# Patient Record
Sex: FEMALE | Race: WHITE | NOT HISPANIC OR LATINO | Employment: OTHER | URBAN - METROPOLITAN AREA
[De-identification: names, ages, dates, MRNs, and addresses within clinical notes are randomized per-mention and may not be internally consistent; named-entity substitution may affect disease eponyms.]

---

## 2018-05-09 ENCOUNTER — LAB VISIT (OUTPATIENT)
Dept: LAB | Facility: HOSPITAL | Age: 58
End: 2018-05-09
Attending: FAMILY MEDICINE
Payer: COMMERCIAL

## 2018-05-09 ENCOUNTER — OFFICE VISIT (OUTPATIENT)
Dept: FAMILY MEDICINE | Facility: CLINIC | Age: 58
End: 2018-05-09
Payer: COMMERCIAL

## 2018-05-09 VITALS
TEMPERATURE: 98 F | HEIGHT: 66 IN | WEIGHT: 182 LBS | SYSTOLIC BLOOD PRESSURE: 115 MMHG | BODY MASS INDEX: 29.25 KG/M2 | HEART RATE: 81 BPM | OXYGEN SATURATION: 95 % | DIASTOLIC BLOOD PRESSURE: 70 MMHG | RESPIRATION RATE: 18 BRPM

## 2018-05-09 DIAGNOSIS — E55.9 VITAMIN D DEFICIENCY: Primary | ICD-10-CM

## 2018-05-09 DIAGNOSIS — E78.2 MIXED HYPERLIPIDEMIA: Chronic | ICD-10-CM

## 2018-05-09 DIAGNOSIS — J43.8 OTHER EMPHYSEMA: Chronic | ICD-10-CM

## 2018-05-09 DIAGNOSIS — K21.9 GASTROESOPHAGEAL REFLUX DISEASE WITHOUT ESOPHAGITIS: Chronic | ICD-10-CM

## 2018-05-09 DIAGNOSIS — R92.8 ABNORMAL MAMMOGRAM: ICD-10-CM

## 2018-05-09 DIAGNOSIS — E55.9 VITAMIN D DEFICIENCY: ICD-10-CM

## 2018-05-09 PROBLEM — I10 ESSENTIAL HYPERTENSION: Chronic | Status: ACTIVE | Noted: 2018-05-09

## 2018-05-09 LAB — 25(OH)D3+25(OH)D2 SERPL-MCNC: 27 NG/ML

## 2018-05-09 PROCEDURE — 82306 VITAMIN D 25 HYDROXY: CPT

## 2018-05-09 PROCEDURE — 99214 OFFICE O/P EST MOD 30 MIN: CPT | Mod: S$GLB,,, | Performed by: FAMILY MEDICINE

## 2018-05-09 PROCEDURE — 3008F BODY MASS INDEX DOCD: CPT | Mod: CPTII,S$GLB,, | Performed by: FAMILY MEDICINE

## 2018-05-09 PROCEDURE — 36415 COLL VENOUS BLD VENIPUNCTURE: CPT

## 2018-05-09 RX ORDER — OMEPRAZOLE 40 MG/1
1 CAPSULE, DELAYED RELEASE ORAL DAILY PRN
COMMUNITY
Start: 2018-04-11 | End: 2021-03-11

## 2018-05-09 RX ORDER — ALBUTEROL SULFATE 90 UG/1
2 AEROSOL, METERED RESPIRATORY (INHALATION) EVERY 4 HOURS PRN
Refills: 0 | COMMUNITY
Start: 2018-03-21 | End: 2020-11-02 | Stop reason: SDUPTHER

## 2018-05-09 RX ORDER — LISINOPRIL 20 MG/1
1 TABLET ORAL DAILY
COMMUNITY
Start: 2018-03-27 | End: 2020-03-16 | Stop reason: SDUPTHER

## 2018-05-09 RX ORDER — FLUOXETINE HYDROCHLORIDE 20 MG/1
1 CAPSULE ORAL DAILY
Status: ON HOLD | COMMUNITY
Start: 2018-04-11 | End: 2020-02-04

## 2018-05-09 RX ORDER — ROSUVASTATIN CALCIUM 40 MG/1
1 TABLET, COATED ORAL DAILY
COMMUNITY
Start: 2018-02-21 | End: 2019-12-05

## 2018-05-09 RX ORDER — ESTROGENS, CONJUGATED 0.62 MG/1
1 TABLET, FILM COATED ORAL DAILY
COMMUNITY
Start: 2018-02-21 | End: 2019-12-05

## 2018-05-09 NOTE — PROGRESS NOTES
Subjective:       Patient ID: Josseline Degroot is a 58 y.o. female.    Chief Complaint: Emphysema (Doesn't like spiriva )    HPI   Ms. Degroot presents for follow-up. Had an abnormal mammogram in January and told to repeat in 6 months.     Was started on Spiriva for COPD at her last visit. Reports she doesn't like this and has stopped using it because she feels like she is inhaling pieces of the capsule.    Complains of diffuse myalgias, mild, since taking statins. Worse with previous statins.     Reports she's had diarrhea since starting omeprazole and wants to try taking it less often.    Review of Systems   Constitutional: Negative for activity change, appetite change, diaphoresis and fatigue.   Respiratory: Positive for cough and wheezing. Negative for shortness of breath and stridor.    Cardiovascular: Negative for chest pain, palpitations and leg swelling.   Musculoskeletal: Positive for myalgias. Negative for gait problem, joint swelling and neck pain.       Past Medical History:   Diagnosis Date    Asthma     Depression     GERD (gastroesophageal reflux disease)     Hyperlipidemia     Hypertension      Past Surgical History:   Procedure Laterality Date    COLONOSCOPY      HYSTERECTOMY      TOTAL     Social History     Social History    Marital status:      Spouse name: N/A    Number of children: N/A    Years of education: N/A     Occupational History    Not on file.     Social History Main Topics    Smoking status: Former Smoker     Types: Cigarettes     Quit date: 10/1/2017    Smokeless tobacco: Never Used    Alcohol use No    Drug use: No    Sexual activity: No     Other Topics Concern    Not on file     Social History Narrative    No narrative on file     Family History   Problem Relation Age of Onset    Heart disease Mother     Heart disease Father     Cancer Father     Heart disease Brother        Objective:      /70 (BP Location: Left arm, Patient Position: Sitting)   Pulse  "81   Temp 97.9 °F (36.6 °C) (Tympanic)   Resp 18   Ht 5' 6" (1.676 m)   Wt 82.6 kg (182 lb)   LMP  (LMP Unknown)   SpO2 95%   BMI 29.38 kg/m²   Physical Exam   Constitutional: She is oriented to person, place, and time. She appears well-developed and well-nourished. No distress.   Cardiovascular: Normal rate and regular rhythm.    No murmur heard.  Neurological: She is alert and oriented to person, place, and time.   Skin: Skin is warm and dry. She is not diaphoretic. No pallor.   Psychiatric: She has a normal mood and affect. Her behavior is normal. Judgment and thought content normal.   Vitals reviewed.      Assessment:       1. Vitamin D deficiency    2. Other emphysema    3. Mixed hyperlipidemia    4. Gastroesophageal reflux disease without esophagitis    5. Abnormal mammogram        Plan:       Vitamin D deficiency  -     Muscle pains potentially due to statin vs vit d def vs other cause; check vitamin D level    Other emphysema  -     Stop spiriva; start anoro ellipta  -     umeclidinium-vilanterol (ANORO ELLIPTA) 62.5-25 mcg/actuation DsDv; Inhale 1 puff into the lungs once daily.  Dispense: 1 each; Refill: 0  -     umeclidinium-vilanterol (ANORO ELLIPTA) 62.5-25 mcg/actuation DsDv; Inhale 1 puff into the lungs once daily.  Dispense: 3 each; Refill: 3    Mixed hyperlipidemia  -    On crestor, possibly causing muscle pains - start taking CoQ10    Gastroesophageal reflux disease without esophagitis  - change omeprazole frequency to prn    Abnormal mammogram  -     Mammo Digital Diagnostic Bilateral; Future; Expected date: 07/11/2018            Risks, benefits, and side effects were discussed with the patient. All questions were answered to the fullest satisfaction of the patient, and pt verbalized understanding and agreement to treatment plan. Pt was to call with any new or worsening symptoms, or present to the ER.    "

## 2018-05-10 ENCOUNTER — TELEPHONE (OUTPATIENT)
Dept: FAMILY MEDICINE | Facility: CLINIC | Age: 58
End: 2018-05-10

## 2018-05-10 DIAGNOSIS — E55.9 VITAMIN D DEFICIENCY: Primary | ICD-10-CM

## 2018-05-10 RX ORDER — ERGOCALCIFEROL 1.25 MG/1
50000 CAPSULE ORAL
Qty: 4 CAPSULE | Refills: 2 | Status: SHIPPED | OUTPATIENT
Start: 2018-05-10 | End: 2020-11-02 | Stop reason: SDUPTHER

## 2018-05-10 NOTE — TELEPHONE ENCOUNTER
----- Message from Bessy Yan sent at 5/10/2018  9:20 AM CDT -----  Type:  Patient Returning Call    Who Called:  Patient  Who Left Message for Patient:  Not documented  Does the patient know what this is regarding?:  Possibly test results  Best Call Back Number:  [phone]  Additional Information:  Phone call not documented in file.

## 2018-05-10 NOTE — TELEPHONE ENCOUNTER
Pt contacted informed of Vit D level being low and rx Vit D being sent to pharmacy,. Pt verbalized understanding

## 2019-05-02 DIAGNOSIS — Z12.11 COLON CANCER SCREENING: ICD-10-CM

## 2019-05-03 DIAGNOSIS — Z11.59 NEED FOR HEPATITIS C SCREENING TEST: ICD-10-CM

## 2019-12-05 ENCOUNTER — OFFICE VISIT (OUTPATIENT)
Dept: FAMILY MEDICINE | Facility: CLINIC | Age: 59
End: 2019-12-05
Payer: COMMERCIAL

## 2019-12-05 VITALS
TEMPERATURE: 98 F | BODY MASS INDEX: 30.24 KG/M2 | RESPIRATION RATE: 15 BRPM | WEIGHT: 188.19 LBS | SYSTOLIC BLOOD PRESSURE: 125 MMHG | HEART RATE: 84 BPM | OXYGEN SATURATION: 95 % | HEIGHT: 66 IN | DIASTOLIC BLOOD PRESSURE: 72 MMHG

## 2019-12-05 DIAGNOSIS — I10 PRIMARY HYPERTENSION: ICD-10-CM

## 2019-12-05 DIAGNOSIS — F41.9 ANXIETY: ICD-10-CM

## 2019-12-05 DIAGNOSIS — J44.9 CHRONIC OBSTRUCTIVE PULMONARY DISEASE, UNSPECIFIED COPD TYPE: ICD-10-CM

## 2019-12-05 DIAGNOSIS — E55.9 VITAMIN D DEFICIENCY: ICD-10-CM

## 2019-12-05 DIAGNOSIS — E78.5 HYPERLIPIDEMIA, UNSPECIFIED HYPERLIPIDEMIA TYPE: Primary | ICD-10-CM

## 2019-12-05 DIAGNOSIS — N95.1 MENOPAUSAL SYMPTOM: ICD-10-CM

## 2019-12-05 PROCEDURE — 99214 OFFICE O/P EST MOD 30 MIN: CPT | Mod: S$GLB,,, | Performed by: FAMILY MEDICINE

## 2019-12-05 PROCEDURE — 99214 PR OFFICE/OUTPT VISIT, EST, LEVL IV, 30-39 MIN: ICD-10-PCS | Mod: S$GLB,,, | Performed by: FAMILY MEDICINE

## 2019-12-05 PROCEDURE — 3008F BODY MASS INDEX DOCD: CPT | Mod: CPTII,S$GLB,, | Performed by: FAMILY MEDICINE

## 2019-12-05 PROCEDURE — 3008F PR BODY MASS INDEX (BMI) DOCUMENTED: ICD-10-PCS | Mod: CPTII,S$GLB,, | Performed by: FAMILY MEDICINE

## 2019-12-05 RX ORDER — ROSUVASTATIN CALCIUM 10 MG/1
10 TABLET, COATED ORAL DAILY
Qty: 90 TABLET | Refills: 3 | Status: SHIPPED | OUTPATIENT
Start: 2019-12-05 | End: 2020-01-07 | Stop reason: SDUPTHER

## 2019-12-05 RX ORDER — BUPROPION HYDROCHLORIDE 150 MG/1
150 TABLET ORAL DAILY
COMMUNITY
End: 2020-11-06 | Stop reason: SDUPTHER

## 2019-12-05 RX ORDER — ESTRADIOL 0.5 MG/1
0.5 TABLET ORAL DAILY
Qty: 30 TABLET | Refills: 11 | Status: SHIPPED | OUTPATIENT
Start: 2019-12-05 | End: 2020-01-07 | Stop reason: SDUPTHER

## 2019-12-05 RX ORDER — EZETIMIBE 10 MG/1
10 TABLET ORAL DAILY
Status: ON HOLD | COMMUNITY
End: 2020-02-04

## 2019-12-05 NOTE — PROGRESS NOTES
"  [facility] - Clinic Note    Subjective      Ms. Degroot is a 59 y.o. female who presents to clinic to establish care.     Was previously on premarin. Started having hot sweats again. Was previously taken off prozac because it made her gain weight. Taking wellbutrin.     Dyslipidemia  Strong family history of CAD. Taken off crestor for joint pain that did not resolve.    COPD  Not smoking. Is on Anoro Ellipta. Used inhaler once.     Htn  Taking lisinopril. At goal today.     Has taken Adipex in the past for weight loss.           [facility] Josseline has a past medical history of COPD (chronic obstructive pulmonary disease), Depression, GERD (gastroesophageal reflux disease), Hyperlipidemia, and Hypertension.   PSXH Josseline has a past surgical history that includes Hysterectomy and Colonoscopy.    Josseline's family history includes Cancer in her father; Heart disease in her brother, father, and mother.    Josseline reports that she quit smoking about 2 years ago. Her smoking use included cigarettes. She has never used smokeless tobacco. She reports that she has current or past drug history. She reports that she does not drink alcohol.   [facility] Josseline has No Known Allergies.   MED Josseline has a current medication list which includes the following prescription(s): bupropion, ergocalciferol, ezetimibe, lisinopril, naltrexone-bupropion, omeprazole, umeclidinium-vilanterol, ventolin hfa, estradiol, fluoxetine, rosuvastatin, umeclidinium-vilanterol, and umeclidinium-vilanterol.     Review of Systems   Endocrine: Positive for heat intolerance.   Musculoskeletal: Negative for arthralgias.     Objective     /72 (BP Location: Left arm, Patient Position: Sitting, BP Method: Medium (Automatic))   Pulse 84   Temp 98.2 °F (36.8 °C) (Oral)   Resp 15   Ht 5' 6" (1.676 m)   Wt 85.4 kg (188 lb 3.2 oz)   LMP  (LMP Unknown)   SpO2 95%   BMI 30.38 kg/m²     Physical Exam   Constitutional: She is well-developed, well-nourished, and in no " distress. No distress.   HENT:   Head: Normocephalic and atraumatic.   Right Ear: External ear normal.   Left Ear: External ear normal.   Nose: Nose normal.   Mouth/Throat: Oropharynx is clear and moist.   Eyes: Conjunctivae are normal.   Cardiovascular: Normal rate, regular rhythm, normal heart sounds and intact distal pulses.   No murmur heard.  Pulmonary/Chest: Effort normal and breath sounds normal. She has no wheezes. She has no rales.   Abdominal: Soft. She exhibits no distension.   Musculoskeletal: She exhibits no deformity.   Lymphadenopathy:     She has no cervical adenopathy.   Neurological: She is alert.   Skin: Skin is warm and dry.   Psychiatric: Affect normal.   Vitals reviewed.    Assessment/Plan     1. Hyperlipidemia, unspecified hyperlipidemia type  ezetimibe (ZETIA) 10 mg tablet    Lipid panel    rosuvastatin (CRESTOR) 10 MG tablet   2. Primary hypertension  Comprehensive metabolic panel   3. Vitamin D deficiency  Vitamin D   4. Menopausal symptom  estradiol (ESTRACE) 0.5 MG tablet   5. Chronic obstructive pulmonary disease, unspecified COPD type  umeclidinium-vilanterol (ANORO ELLIPTA) 62.5-25 mcg/actuation DsDv   6. Anxiety  buPROPion (WELLBUTRIN XL) 150 MG TB24 tablet           Future Appointments   Date Time Provider Department Center   1/7/2020  9:00 AM Lori Cool MD [facility]       Lori Cool MD  Family Medicine  [facility]  [phone]

## 2019-12-06 ENCOUNTER — LAB VISIT (OUTPATIENT)
Dept: LAB | Facility: HOSPITAL | Age: 59
End: 2019-12-06
Attending: FAMILY MEDICINE
Payer: COMMERCIAL

## 2019-12-06 DIAGNOSIS — Z11.59 NEED FOR HEPATITIS C SCREENING TEST: ICD-10-CM

## 2019-12-06 DIAGNOSIS — E78.5 HYPERLIPIDEMIA, UNSPECIFIED HYPERLIPIDEMIA TYPE: ICD-10-CM

## 2019-12-06 DIAGNOSIS — I10 PRIMARY HYPERTENSION: ICD-10-CM

## 2019-12-06 DIAGNOSIS — E55.9 VITAMIN D DEFICIENCY: ICD-10-CM

## 2019-12-06 LAB
25(OH)D3+25(OH)D2 SERPL-MCNC: 34 NG/ML (ref 30–96)
ALBUMIN SERPL BCP-MCNC: 3.9 G/DL (ref 3.5–5.2)
ALP SERPL-CCNC: 69 U/L (ref 55–135)
ALT SERPL W/O P-5'-P-CCNC: 23 U/L (ref 10–44)
ANION GAP SERPL CALC-SCNC: 10 MMOL/L (ref 8–16)
AST SERPL-CCNC: 22 U/L (ref 10–40)
BILIRUB SERPL-MCNC: 0.5 MG/DL (ref 0.1–1)
BUN SERPL-MCNC: 13 MG/DL (ref 8–23)
CALCIUM SERPL-MCNC: 8.9 MG/DL (ref 8.7–10.5)
CHLORIDE SERPL-SCNC: 106 MMOL/L (ref 95–110)
CHOLEST SERPL-MCNC: 222 MG/DL (ref 120–199)
CHOLEST/HDLC SERPL: 4.2 {RATIO} (ref 2–5)
CO2 SERPL-SCNC: 25 MMOL/L (ref 23–29)
CREAT SERPL-MCNC: 0.7 MG/DL (ref 0.5–1.4)
EST. GFR  (AFRICAN AMERICAN): >60 ML/MIN/1.73 M^2
EST. GFR  (NON AFRICAN AMERICAN): >60 ML/MIN/1.73 M^2
GLUCOSE SERPL-MCNC: 105 MG/DL (ref 70–110)
HDLC SERPL-MCNC: 53 MG/DL (ref 40–75)
HDLC SERPL: 23.9 % (ref 20–50)
LDLC SERPL CALC-MCNC: 135.4 MG/DL (ref 63–159)
NONHDLC SERPL-MCNC: 169 MG/DL
POTASSIUM SERPL-SCNC: 4.4 MMOL/L (ref 3.5–5.1)
PROT SERPL-MCNC: 6.1 G/DL (ref 6–8.4)
SODIUM SERPL-SCNC: 141 MMOL/L (ref 136–145)
TRIGL SERPL-MCNC: 168 MG/DL (ref 30–150)

## 2019-12-06 PROCEDURE — 80061 LIPID PANEL: CPT

## 2019-12-06 PROCEDURE — 80053 COMPREHEN METABOLIC PANEL: CPT

## 2019-12-06 PROCEDURE — 82306 VITAMIN D 25 HYDROXY: CPT

## 2019-12-06 PROCEDURE — 36415 COLL VENOUS BLD VENIPUNCTURE: CPT

## 2019-12-26 ENCOUNTER — PATIENT OUTREACH (OUTPATIENT)
Dept: ADMINISTRATIVE | Facility: HOSPITAL | Age: 59
End: 2019-12-26

## 2020-01-07 ENCOUNTER — OFFICE VISIT (OUTPATIENT)
Dept: FAMILY MEDICINE | Facility: CLINIC | Age: 60
End: 2020-01-07
Payer: COMMERCIAL

## 2020-01-07 ENCOUNTER — PATIENT MESSAGE (OUTPATIENT)
Dept: FAMILY MEDICINE | Facility: CLINIC | Age: 60
End: 2020-01-07

## 2020-01-07 VITALS
BODY MASS INDEX: 30.82 KG/M2 | HEIGHT: 66 IN | TEMPERATURE: 98 F | SYSTOLIC BLOOD PRESSURE: 110 MMHG | RESPIRATION RATE: 15 BRPM | HEART RATE: 81 BPM | OXYGEN SATURATION: 95 % | WEIGHT: 191.81 LBS | DIASTOLIC BLOOD PRESSURE: 70 MMHG

## 2020-01-07 DIAGNOSIS — Z12.39 BREAST CANCER SCREENING: Primary | ICD-10-CM

## 2020-01-07 DIAGNOSIS — K21.9 GASTROESOPHAGEAL REFLUX DISEASE, ESOPHAGITIS PRESENCE NOT SPECIFIED: ICD-10-CM

## 2020-01-07 DIAGNOSIS — N95.1 MENOPAUSAL SYMPTOM: ICD-10-CM

## 2020-01-07 DIAGNOSIS — G89.29 CHRONIC BILATERAL LOW BACK PAIN WITH SCIATICA, SCIATICA LATERALITY UNSPECIFIED: ICD-10-CM

## 2020-01-07 DIAGNOSIS — E78.5 HYPERLIPIDEMIA, UNSPECIFIED HYPERLIPIDEMIA TYPE: ICD-10-CM

## 2020-01-07 DIAGNOSIS — E66.9 OBESITY (BMI 30-39.9): ICD-10-CM

## 2020-01-07 DIAGNOSIS — M54.40 CHRONIC BILATERAL LOW BACK PAIN WITH SCIATICA, SCIATICA LATERALITY UNSPECIFIED: ICD-10-CM

## 2020-01-07 PROCEDURE — 3008F PR BODY MASS INDEX (BMI) DOCUMENTED: ICD-10-PCS | Mod: CPTII,S$GLB,, | Performed by: FAMILY MEDICINE

## 2020-01-07 PROCEDURE — 99214 PR OFFICE/OUTPT VISIT, EST, LEVL IV, 30-39 MIN: ICD-10-PCS | Mod: S$GLB,,, | Performed by: FAMILY MEDICINE

## 2020-01-07 PROCEDURE — 3008F BODY MASS INDEX DOCD: CPT | Mod: CPTII,S$GLB,, | Performed by: FAMILY MEDICINE

## 2020-01-07 PROCEDURE — 99214 OFFICE O/P EST MOD 30 MIN: CPT | Mod: S$GLB,,, | Performed by: FAMILY MEDICINE

## 2020-01-07 PROCEDURE — 3074F SYST BP LT 130 MM HG: CPT | Mod: CPTII,S$GLB,, | Performed by: FAMILY MEDICINE

## 2020-01-07 PROCEDURE — 3078F DIAST BP <80 MM HG: CPT | Mod: CPTII,S$GLB,, | Performed by: FAMILY MEDICINE

## 2020-01-07 PROCEDURE — 3078F PR MOST RECENT DIASTOLIC BLOOD PRESSURE < 80 MM HG: ICD-10-PCS | Mod: CPTII,S$GLB,, | Performed by: FAMILY MEDICINE

## 2020-01-07 PROCEDURE — 3074F PR MOST RECENT SYSTOLIC BLOOD PRESSURE < 130 MM HG: ICD-10-PCS | Mod: CPTII,S$GLB,, | Performed by: FAMILY MEDICINE

## 2020-01-07 RX ORDER — PHENTERMINE HYDROCHLORIDE 37.5 MG/1
37.5 CAPSULE ORAL EVERY MORNING
Qty: 30 CAPSULE | Refills: 2 | Status: SHIPPED | OUTPATIENT
Start: 2020-01-07 | End: 2020-01-07

## 2020-01-07 RX ORDER — ESTRADIOL 0.5 MG/1
0.5 TABLET ORAL DAILY
Qty: 90 TABLET | Refills: 3 | Status: SHIPPED | OUTPATIENT
Start: 2020-01-07 | End: 2021-01-02 | Stop reason: SDUPTHER

## 2020-01-07 RX ORDER — ROSUVASTATIN CALCIUM 10 MG/1
10 TABLET, COATED ORAL DAILY
Qty: 90 TABLET | Refills: 3 | Status: SHIPPED | OUTPATIENT
Start: 2020-01-07 | End: 2020-11-02 | Stop reason: SDUPTHER

## 2020-01-07 NOTE — PROGRESS NOTES
[facility] - Clinic Note    Subjective      Ms. Degroot is a 59 y.o. female who presents to clinic for follow up.    Severe reflux. Will throw up several times. Coughing up blood - mucous with streaks. No black stool. Has nausea but no vomiting. No weight loss.  Has morbid obesity with comorbidity. Would like to get back on adipex.    Severe low back pain.    Taking Crestor as prescribed.     Started a probiotic    Taking vitamin c with zinc    The 10-year ASCVD risk score (Kvngnisa ROSALES Jr., et al., 2013) is: 4%    Values used to calculate the score:      Age: 59 years      Sex: Female      Is Non- : No      Diabetic: No      Tobacco smoker: No      Systolic Blood Pressure: 110 mmHg      Is BP treated: Yes      HDL Cholesterol: 53 mg/dL      Total Cholesterol: 222 mg/dL      [facility] Josseline has a past medical history of COPD (chronic obstructive pulmonary disease), Depression, GERD (gastroesophageal reflux disease), Hyperlipidemia, and Hypertension.   PSXH Josseline has a past surgical history that includes Hysterectomy and Colonoscopy.    Josseline's family history includes Cancer in her father; Heart disease in her brother, father, and mother.    Josseline reports that she quit smoking about 2 years ago. Her smoking use included cigarettes. She has never used smokeless tobacco. She reports that she has current or past drug history. She reports that she does not drink alcohol.   ADRIENNE Manjarrez has No Known Allergies.   MED Josseline has a current medication list which includes the following prescription(s): bupropion, ergocalciferol, estradiol, ezetimibe, fluoxetine, lisinopril, omeprazole, rosuvastatin, umeclidinium-vilanterol, umeclidinium-vilanterol, umeclidinium-vilanterol, and ventolin hfa.     Review of Systems   Constitutional: Negative for unexpected weight change.   Gastrointestinal: Positive for abdominal pain and nausea. Negative for constipation, diarrhea and vomiting.   Musculoskeletal: Positive for  "back pain.     Objective     /70 (BP Location: Left arm, Patient Position: Sitting, BP Method: Medium (Automatic))   Pulse 81   Temp 98 °F (36.7 °C) (Oral)   Resp 15   Ht 5' 6" (1.676 m)   Wt 87 kg (191 lb 12.8 oz)   LMP  (LMP Unknown)   SpO2 95%   BMI 30.96 kg/m²     Physical Exam   Constitutional:   Obese female, no acute distress   Cardiovascular: Normal rate, regular rhythm, normal heart sounds and intact distal pulses.   No murmur heard.  Pulmonary/Chest: Effort normal and breath sounds normal. No respiratory distress. She has no wheezes. She has no rales.   Musculoskeletal:   Mild tenderness in the low back bilaterally in the piriformis region. No obvious spasm. No deformity. No swelling or erythema   Neurological: She is alert.   Skin: Skin is warm and dry. She is not diaphoretic.   Vitals reviewed.    Assessment/Plan     1. Breast cancer screening  Mammo Digital Screening Bilateral With CAD   2. Menopausal symptom  estradiol (ESTRACE) 0.5 MG tablet   3. Hyperlipidemia, unspecified hyperlipidemia type  rosuvastatin (CRESTOR) 10 MG tablet   4. Obesity (BMI 30-39.9)     5. Gastroesophageal reflux disease, esophagitis presence not specified         Refer to GI for reflux/melena  [facility] [place] for chronic low back pain. Patient is amenable to multiple treatment options.  Will do short term trial of Adipex given patient's comorbidities and BMI. Patient has been on this medicine in the past and has done well.   Recheck magnesium - ok to take Mag-Tab Sr Tabs 84mg for now      F/u in 1-3 months    Lori Cool MD  Family Medicine  [facility]  [phone]    "

## 2020-01-09 ENCOUNTER — PATIENT MESSAGE (OUTPATIENT)
Dept: FAMILY MEDICINE | Facility: CLINIC | Age: 60
End: 2020-01-09

## 2020-01-10 ENCOUNTER — TELEPHONE (OUTPATIENT)
Dept: FAMILY MEDICINE | Facility: CLINIC | Age: 60
End: 2020-01-10

## 2020-01-10 RX ORDER — PHENTERMINE HYDROCHLORIDE 37.5 MG/1
37.5 TABLET ORAL
Qty: 30 TABLET | Refills: 0 | OUTPATIENT
Start: 2020-01-10 | End: 2020-02-09

## 2020-01-10 NOTE — TELEPHONE ENCOUNTER
Pt states yes she  the Phentemine, but they were capsule, she states can not take capsule need the tablets and if so would like the medication to be fill at [facility] in [place] only the Phentemin is to be fill at [facility].

## 2020-01-10 NOTE — TELEPHONE ENCOUNTER
shows that patient filled prescription at [facility] in [place]. Next refill will go to [facility] if she requests. If she says she did not pick it up, can someone please call [facility] and verify whether she did or not? I will [facility]

## 2020-01-10 NOTE — TELEPHONE ENCOUNTER
"Pt previous PP message:     "Dr. britt is Josseline Degroot, you called in my Phentermine, well it should have been called into [facility] an also I dont do the capsules I do the tablets. So can you call it into [facility] as a tablet please? An also my   Mag-Tab Sr Tabs 84mg are you going to call that into [facility]?"    "

## 2020-01-16 ENCOUNTER — INITIAL CONSULT (OUTPATIENT)
Dept: SPINE | Facility: CLINIC | Age: 60
End: 2020-01-16
Payer: COMMERCIAL

## 2020-01-16 VITALS
DIASTOLIC BLOOD PRESSURE: 93 MMHG | HEART RATE: 82 BPM | HEIGHT: 66 IN | SYSTOLIC BLOOD PRESSURE: 135 MMHG | WEIGHT: 191.81 LBS | BODY MASS INDEX: 30.82 KG/M2

## 2020-01-16 DIAGNOSIS — G89.29 CHRONIC RIGHT-SIDED LOW BACK PAIN, UNSPECIFIED WHETHER SCIATICA PRESENT: Primary | ICD-10-CM

## 2020-01-16 DIAGNOSIS — M54.50 CHRONIC RIGHT-SIDED LOW BACK PAIN, UNSPECIFIED WHETHER SCIATICA PRESENT: Primary | ICD-10-CM

## 2020-01-16 PROCEDURE — 3075F SYST BP GE 130 - 139MM HG: CPT | Mod: S$GLB,,, | Performed by: PHYSICAL MEDICINE & REHABILITATION

## 2020-01-16 PROCEDURE — 99204 OFFICE O/P NEW MOD 45 MIN: CPT | Mod: S$GLB,,, | Performed by: PHYSICAL MEDICINE & REHABILITATION

## 2020-01-16 PROCEDURE — 3008F BODY MASS INDEX DOCD: CPT | Mod: S$GLB,,, | Performed by: PHYSICAL MEDICINE & REHABILITATION

## 2020-01-16 PROCEDURE — 3008F PR BODY MASS INDEX (BMI) DOCUMENTED: ICD-10-PCS | Mod: S$GLB,,, | Performed by: PHYSICAL MEDICINE & REHABILITATION

## 2020-01-16 PROCEDURE — 3080F PR MOST RECENT DIASTOLIC BLOOD PRESSURE >= 90 MM HG: ICD-10-PCS | Mod: S$GLB,,, | Performed by: PHYSICAL MEDICINE & REHABILITATION

## 2020-01-16 PROCEDURE — 99204 PR OFFICE/OUTPT VISIT, NEW, LEVL IV, 45-59 MIN: ICD-10-PCS | Mod: S$GLB,,, | Performed by: PHYSICAL MEDICINE & REHABILITATION

## 2020-01-16 PROCEDURE — 3075F PR MOST RECENT SYSTOLIC BLOOD PRESS GE 130-139MM HG: ICD-10-PCS | Mod: S$GLB,,, | Performed by: PHYSICAL MEDICINE & REHABILITATION

## 2020-01-16 PROCEDURE — 3080F DIAST BP >= 90 MM HG: CPT | Mod: S$GLB,,, | Performed by: PHYSICAL MEDICINE & REHABILITATION

## 2020-01-16 NOTE — LETTER
January 16, 2020      Lori Cool MD  [address] [facility] [place] [address]           [facility]  [address] [place] [address] [address]  [place] [address]  Phone: [phone]  Fax: [phone]          Patient: Josseline Degroot   MR Number: [id number]   YOB: 1960   Date of Visit: 1/16/2020       Dear Dr. Lori Cool:    Thank you for referring Josseline Degroot to me for evaluation. Attached you will find relevant portions of my assessment and plan of care.    If you have questions, please do not hesitate to call me. I look forward to following Josseline Degroot along with you.    Sincerely,    Hugo Key MD    Enclosure  CC:  No Recipients    If you would like to receive this communication electronically, please contact [email or web address] or [phone] to request more information on [employer] access.    For providers and/or their staff who would like to refer a patient to [facility], please contact us through our one-stop-shop provider referral line, [facility], at [phone].    If you feel you have received this communication in error or would no longer like to receive these types of communications, please e-mail [email or web address]

## 2020-01-16 NOTE — PROGRESS NOTES
SUBJECTIVE:    Patient ID: Josseline Degroot is a 59 y.o. female.    Chief Complaint: Back Pain    This is a 59-year-old woman who sees Dr. Cool for her primary care. Has history of COPD hypertension and hyperlipidemia as well as gastroesophageal reflux disease.  Long history of low back pain going back at least 10 years.  Has been worsening over the past 3 or 4 years.  She has also been going to a chiropractor for the past 3 or 4 years which was initially helping but more recently she has not been responding to chiropractic treatment.  She went to physical therapy about a year ago which did not help.  Current complaint is right-sided low back pain at the lumbosacral junction with occasional nonspecific radiating discomfort down the right leg.  She is unable to tell me exactly were her leg hurts.  Denies weakness bowel or bladder dysfunction fever chills sweats or unexpected weight loss.  Current pain level is 5/10.  I reviewed lumbar x-rays from her chiropractors office done in December of last year which show mild to moderate multilevel degenerative changes.  She says her chiropractor gave her a  heel lift because her hips were on even.  Uncertain whether that is helping        Past Medical History:   Diagnosis Date    COPD (chronic obstructive pulmonary disease)     Depression     GERD (gastroesophageal reflux disease)     Hyperlipidemia     Hypertension      Social History     Socioeconomic History    Marital status:      Spouse name: Not on file    Number of children: Not on file    Years of education: Not on file    Highest education level: Not on file   Occupational History    Not on file   Social Needs    Financial resource strain: Not on file    Food insecurity:     Worry: Not on file     Inability: Not on file    Transportation needs:     Medical: Not on file     Non-medical: Not on file   Tobacco Use    Smoking status: Former Smoker     Types: Cigarettes     Last attempt to quit:  "10/1/2017     Years since quittin.2    Smokeless tobacco: Never Used   Substance and Sexual Activity    Alcohol use: No    Drug use: Not Currently    Sexual activity: Not Currently     Partners: Male   Lifestyle    Physical activity:     Days per week: Not on file     Minutes per session: Not on file    Stress: Not on file   Relationships    Social connections:     Talks on phone: Not on file     Gets together: Not on file     Attends Synagogue service: Not on file     Active member of club or organization: Not on file     Attends meetings of clubs or organizations: Not on file     Relationship status: Not on file   Other Topics Concern    Not on file   Social History Narrative    Not on file     Past Surgical History:   Procedure Laterality Date    COLONOSCOPY      HYSTERECTOMY      TOTAL     Family History   Problem Relation Age of Onset    Heart disease Mother     Heart disease Father     Cancer Father     Heart disease Brother      Vitals:    20 1114   BP: (!) 135/93   Pulse: 82   Weight: 87 kg (191 lb 12.8 oz)   Height: 5' 6" (1.676 m)       Review of Systems   Constitutional: Negative for chills, diaphoresis, fatigue, fever and unexpected weight change.   HENT: Negative for trouble swallowing.    Eyes: Negative for visual disturbance.   Respiratory: Negative for shortness of breath.    Cardiovascular: Negative for chest pain.   Gastrointestinal: Negative for abdominal pain, constipation, nausea and vomiting.   Genitourinary: Negative for difficulty urinating.   Musculoskeletal: Negative for arthralgias, back pain, gait problem, joint swelling, myalgias, neck pain and neck stiffness.   Neurological: Negative for dizziness, speech difficulty, weakness, light-headedness, numbness and headaches.          Objective:      Physical Exam   Constitutional: She is oriented to person, place, and time. She appears well-developed and well-nourished.   Neurological: She is alert and oriented to " person, place, and time.   She is awake and in no acute distress  No point tenderness or palpable masses about the lumbar spine  Forward flexion is to about 60° before she complains of pain at the lumbosacral junction.  Extension causes no pain  She can heel and toe walk normally  Deep tendon reflexes +1 at the knees and +1 at the ankles  Strength is normal in both lower extremities  KAY testing on the right causes right-sided low back pain.  KAY testing on the left is negative  Straight leg raising negative bilaterally           Assessment:       1. Chronic right-sided low back pain, unspecified whether sciatica present           Plan:     she has a nonfocal examination from a neurological standpoint and no historical red flags.  She has chronic low back pain on the basis of degenerative disc disease.  She has failed treatment with a chiropractor and physical therapy.  I recommend MRI in preparation for epidural steroid injections.  She actually says she had what sounds like epidural injections for the cervical spine which helped significantly.  I will follow-up with her after the scan      Chronic right-sided low back pain, unspecified whether sciatica present  -     MRI Lumbar Spine Without Contrast; Future; Expected date: 01/16/2020

## 2020-01-17 NOTE — TELEPHONE ENCOUNTER
Tell her we are really sorry but this is a controlled substance so she can only have one prescription per month. We will be happy to prescribe her the tablets on the next refill

## 2020-01-22 ENCOUNTER — PATIENT MESSAGE (OUTPATIENT)
Dept: FAMILY MEDICINE | Facility: CLINIC | Age: 60
End: 2020-01-22

## 2020-01-24 RX ORDER — MAGNESIUM L-LACTATE 84 MG
84 TABLET, EXTENDED RELEASE ORAL ONCE
Qty: 30 TABLET | Refills: 11 | COMMUNITY
Start: 2020-01-24 | End: 2020-01-24

## 2020-01-24 RX ORDER — MAGNESIUM L-LACTATE 84 MG
84 TABLET, EXTENDED RELEASE ORAL DAILY
Qty: 30 TABLET | Refills: 0 | COMMUNITY
Start: 2020-01-24 | End: 2020-03-10 | Stop reason: SDUPTHER

## 2020-01-27 ENCOUNTER — TELEPHONE (OUTPATIENT)
Dept: PAIN MEDICINE | Facility: CLINIC | Age: 60
End: 2020-01-27

## 2020-01-27 ENCOUNTER — OFFICE VISIT (OUTPATIENT)
Dept: SPINE | Facility: CLINIC | Age: 60
End: 2020-01-27
Payer: COMMERCIAL

## 2020-01-27 VITALS
DIASTOLIC BLOOD PRESSURE: 84 MMHG | SYSTOLIC BLOOD PRESSURE: 124 MMHG | WEIGHT: 191.81 LBS | HEART RATE: 78 BPM | BODY MASS INDEX: 30.82 KG/M2 | HEIGHT: 66 IN

## 2020-01-27 DIAGNOSIS — G89.29 CHRONIC RIGHT-SIDED LOW BACK PAIN, UNSPECIFIED WHETHER SCIATICA PRESENT: Primary | ICD-10-CM

## 2020-01-27 DIAGNOSIS — M54.50 CHRONIC RIGHT-SIDED LOW BACK PAIN, UNSPECIFIED WHETHER SCIATICA PRESENT: Primary | ICD-10-CM

## 2020-01-27 DIAGNOSIS — M47.816 LUMBAR SPONDYLOSIS: Primary | ICD-10-CM

## 2020-01-27 PROCEDURE — 3074F PR MOST RECENT SYSTOLIC BLOOD PRESSURE < 130 MM HG: ICD-10-PCS | Mod: S$GLB,,, | Performed by: PHYSICAL MEDICINE & REHABILITATION

## 2020-01-27 PROCEDURE — 3079F DIAST BP 80-89 MM HG: CPT | Mod: S$GLB,,, | Performed by: PHYSICAL MEDICINE & REHABILITATION

## 2020-01-27 PROCEDURE — 99213 OFFICE O/P EST LOW 20 MIN: CPT | Mod: S$GLB,,, | Performed by: PHYSICAL MEDICINE & REHABILITATION

## 2020-01-27 PROCEDURE — 3008F PR BODY MASS INDEX (BMI) DOCUMENTED: ICD-10-PCS | Mod: S$GLB,,, | Performed by: PHYSICAL MEDICINE & REHABILITATION

## 2020-01-27 PROCEDURE — 3079F PR MOST RECENT DIASTOLIC BLOOD PRESSURE 80-89 MM HG: ICD-10-PCS | Mod: S$GLB,,, | Performed by: PHYSICAL MEDICINE & REHABILITATION

## 2020-01-27 PROCEDURE — 99213 PR OFFICE/OUTPT VISIT, EST, LEVL III, 20-29 MIN: ICD-10-PCS | Mod: S$GLB,,, | Performed by: PHYSICAL MEDICINE & REHABILITATION

## 2020-01-27 PROCEDURE — 3074F SYST BP LT 130 MM HG: CPT | Mod: S$GLB,,, | Performed by: PHYSICAL MEDICINE & REHABILITATION

## 2020-01-27 PROCEDURE — 3008F BODY MASS INDEX DOCD: CPT | Mod: S$GLB,,, | Performed by: PHYSICAL MEDICINE & REHABILITATION

## 2020-01-27 NOTE — PROGRESS NOTES
SUBJECTIVE:    Patient ID: Josseline Degroot is a 59 y.o. female.    Chief Complaint: Follow-up (mri)    She is here to review her lumbar MRI which was done on 2020 to evaluate her primary complaint of right-sided low back pain at the lumbosacral junction.  This study was done at [facility].  Impression:  Severe central canal stenosis L3-4.  Mild central canal stenosis L2-3.  Multilevel spondylosis and facet disease with varying degrees of neural foraminal and lateral recess narrowing.  Current pain level is 4 out        Past Medical History:   Diagnosis Date    COPD (chronic obstructive pulmonary disease)     Depression     GERD (gastroesophageal reflux disease)     Hyperlipidemia     Hypertension      Social History     Socioeconomic History    Marital status:      Spouse name: Not on file    Number of children: Not on file    Years of education: Not on file    Highest education level: Not on file   Occupational History    Not on file   Social Needs    Financial resource strain: Not on file    Food insecurity:     Worry: Not on file     Inability: Not on file    Transportation needs:     Medical: Not on file     Non-medical: Not on file   Tobacco Use    Smoking status: Former Smoker     Types: Cigarettes     Last attempt to quit: 10/1/2017     Years since quittin.3    Smokeless tobacco: Never Used   Substance and Sexual Activity    Alcohol use: No    Drug use: Not Currently    Sexual activity: Not Currently     Partners: Male   Lifestyle    Physical activity:     Days per week: Not on file     Minutes per session: Not on file    Stress: Not on file   Relationships    Social connections:     Talks on phone: Not on file     Gets together: Not on file     Attends Mandaeism service: Not on file     Active member of club or organization: Not on file     Attends meetings of clubs or organizations: Not on file     Relationship status: Not on file   Other Topics Concern    Not on  "file   Social History Narrative    Not on file     Past Surgical History:   Procedure Laterality Date    COLONOSCOPY      HYSTERECTOMY      TOTAL     Family History   Problem Relation Age of Onset    Heart disease Mother     Heart disease Father     Cancer Father     Heart disease Brother      Vitals:    01/27/20 1131   BP: 124/84   Pulse: 78   Weight: 87 kg (191 lb 12.8 oz)   Height: 5' 6" (1.676 m)       Review of Systems   Constitutional: Negative for chills, diaphoresis, fatigue, fever and unexpected weight change.   HENT: Negative for trouble swallowing.    Eyes: Negative for visual disturbance.   Respiratory: Negative for shortness of breath.    Cardiovascular: Negative for chest pain.   Gastrointestinal: Negative for abdominal pain, constipation, nausea and vomiting.   Genitourinary: Negative for difficulty urinating.   Musculoskeletal: Negative for arthralgias, back pain, gait problem, joint swelling, myalgias, neck pain and neck stiffness.   Neurological: Negative for dizziness, speech difficulty, weakness, light-headedness, numbness and headaches.          Objective:      Physical Exam   Constitutional: She is oriented to person, place, and time. She appears well-developed and well-nourished.   Neurological: She is alert and oriented to person, place, and time.           Assessment:       1. Chronic right-sided low back pain, unspecified whether sciatica present           Plan:     will get her scheduled for medial branch blocks and radiofrequency ablation of the L4-5 and L5-S1 facet joints on the right.  Follow-up with me after the procedure      Chronic right-sided low back pain, unspecified whether sciatica present        "

## 2020-01-27 NOTE — H&P (VIEW-ONLY)
SUBJECTIVE:    Patient ID: Josseline Degroot is a 59 y.o. female.    Chief Complaint: Follow-up (mri)    She is here to review her lumbar MRI which was done on 2020 to evaluate her primary complaint of right-sided low back pain at the lumbosacral junction.  This study was done at [facility].  Impression:  Severe central canal stenosis L3-4.  Mild central canal stenosis L2-3.  Multilevel spondylosis and facet disease with varying degrees of neural foraminal and lateral recess narrowing.  Current pain level is 4 out        Past Medical History:   Diagnosis Date    COPD (chronic obstructive pulmonary disease)     Depression     GERD (gastroesophageal reflux disease)     Hyperlipidemia     Hypertension      Social History     Socioeconomic History    Marital status:      Spouse name: Not on file    Number of children: Not on file    Years of education: Not on file    Highest education level: Not on file   Occupational History    Not on file   Social Needs    Financial resource strain: Not on file    Food insecurity:     Worry: Not on file     Inability: Not on file    Transportation needs:     Medical: Not on file     Non-medical: Not on file   Tobacco Use    Smoking status: Former Smoker     Types: Cigarettes     Last attempt to quit: 10/1/2017     Years since quittin.3    Smokeless tobacco: Never Used   Substance and Sexual Activity    Alcohol use: No    Drug use: Not Currently    Sexual activity: Not Currently     Partners: Male   Lifestyle    Physical activity:     Days per week: Not on file     Minutes per session: Not on file    Stress: Not on file   Relationships    Social connections:     Talks on phone: Not on file     Gets together: Not on file     Attends [employer] service: Not on file     Active member of club or organization: Not on file     Attends meetings of clubs or organizations: Not on file     Relationship status: Not on file   Other Topics Concern    Not on  "file   Social History Narrative    Not on file     Past Surgical History:   Procedure Laterality Date    COLONOSCOPY      HYSTERECTOMY      TOTAL     Family History   Problem Relation Age of Onset    Heart disease Mother     Heart disease Father     Cancer Father     Heart disease Brother      Vitals:    01/27/20 1131   BP: 124/84   Pulse: 78   Weight: 87 kg (191 lb 12.8 oz)   Height: 5' 6" (1.676 m)       Review of Systems   Constitutional: Negative for chills, diaphoresis, fatigue, fever and unexpected weight change.   HENT: Negative for trouble swallowing.    Eyes: Negative for visual disturbance.   Respiratory: Negative for shortness of breath.    Cardiovascular: Negative for chest pain.   Gastrointestinal: Negative for abdominal pain, constipation, nausea and vomiting.   Genitourinary: Negative for difficulty urinating.   Musculoskeletal: Negative for arthralgias, back pain, gait problem, joint swelling, myalgias, neck pain and neck stiffness.   Neurological: Negative for dizziness, speech difficulty, weakness, light-headedness, numbness and headaches.          Objective:      Physical Exam   Constitutional: She is oriented to person, place, and time. She appears well-developed and well-nourished.   Neurological: She is alert and oriented to person, place, and time.           Assessment:       1. Chronic right-sided low back pain, unspecified whether sciatica present           Plan:     will get her scheduled for medial branch blocks and radiofrequency ablation of the L4-5 and L5-S1 facet joints on the right.  Follow-up with me after the procedure      Chronic right-sided low back pain, unspecified whether sciatica present        "

## 2020-01-27 NOTE — TELEPHONE ENCOUNTER
----- Message from Hugo Kye MD sent at 1/27/2020 11:48 AM CST -----  Please schedule for medial branch blocks and subsequent radiofrequency ablation as indicated of the right L4-5 and L5-S1 facet joint

## 2020-01-27 NOTE — H&P (VIEW-ONLY)
SUBJECTIVE:    Patient ID: Josseline Degroot is a 59 y.o. female.    Chief Complaint: Follow-up (mri)    She is here to review her lumbar MRI which was done on 2020 to evaluate her primary complaint of right-sided low back pain at the lumbosacral junction.  This study was done at [facility].  Impression:  Severe central canal stenosis L3-4.  Mild central canal stenosis L2-3.  Multilevel spondylosis and facet disease with varying degrees of neural foraminal and lateral recess narrowing.  Current pain level is 4 out        Past Medical History:   Diagnosis Date    COPD (chronic obstructive pulmonary disease)     Depression     GERD (gastroesophageal reflux disease)     Hyperlipidemia     Hypertension      Social History     Socioeconomic History    Marital status:      Spouse name: Not on file    Number of children: Not on file    Years of education: Not on file    Highest education level: Not on file   Occupational History    Not on file   Social Needs    Financial resource strain: Not on file    Food insecurity:     Worry: Not on file     Inability: Not on file    Transportation needs:     Medical: Not on file     Non-medical: Not on file   Tobacco Use    Smoking status: Former Smoker     Types: Cigarettes     Last attempt to quit: 10/1/2017     Years since quittin.3    Smokeless tobacco: Never Used   Substance and Sexual Activity    Alcohol use: No    Drug use: Not Currently    Sexual activity: Not Currently     Partners: Male   Lifestyle    Physical activity:     Days per week: Not on file     Minutes per session: Not on file    Stress: Not on file   Relationships    Social connections:     Talks on phone: Not on file     Gets together: Not on file     Attends Yazidism service: Not on file     Active member of club or organization: Not on file     Attends meetings of clubs or organizations: Not on file     Relationship status: Not on file   Other Topics Concern    Not on  "file   Social History Narrative    Not on file     Past Surgical History:   Procedure Laterality Date    COLONOSCOPY      HYSTERECTOMY      TOTAL     Family History   Problem Relation Age of Onset    Heart disease Mother     Heart disease Father     Cancer Father     Heart disease Brother      Vitals:    01/27/20 1131   BP: 124/84   Pulse: 78   Weight: 87 kg (191 lb 12.8 oz)   Height: 5' 6" (1.676 m)       Review of Systems   Constitutional: Negative for chills, diaphoresis, fatigue, fever and unexpected weight change.   HENT: Negative for trouble swallowing.    Eyes: Negative for visual disturbance.   Respiratory: Negative for shortness of breath.    Cardiovascular: Negative for chest pain.   Gastrointestinal: Negative for abdominal pain, constipation, nausea and vomiting.   Genitourinary: Negative for difficulty urinating.   Musculoskeletal: Negative for arthralgias, back pain, gait problem, joint swelling, myalgias, neck pain and neck stiffness.   Neurological: Negative for dizziness, speech difficulty, weakness, light-headedness, numbness and headaches.          Objective:      Physical Exam   Constitutional: She is oriented to person, place, and time. She appears well-developed and well-nourished.   Neurological: She is alert and oriented to person, place, and time.           Assessment:       1. Chronic right-sided low back pain, unspecified whether sciatica present           Plan:     will get her scheduled for medial branch blocks and radiofrequency ablation of the L4-5 and L5-S1 facet joints on the right.  Follow-up with me after the procedure      Chronic right-sided low back pain, unspecified whether sciatica present        "

## 2020-02-04 ENCOUNTER — HOSPITAL ENCOUNTER (OUTPATIENT)
Facility: AMBULARY SURGERY CENTER | Age: 60
Discharge: HOME | End: 2020-02-04
Attending: ANESTHESIOLOGY | Admitting: ANESTHESIOLOGY
Payer: COMMERCIAL

## 2020-02-04 VITALS
OXYGEN SATURATION: 97 % | WEIGHT: 191 LBS | DIASTOLIC BLOOD PRESSURE: 76 MMHG | RESPIRATION RATE: 20 BRPM | HEIGHT: 66 IN | SYSTOLIC BLOOD PRESSURE: 129 MMHG | HEART RATE: 76 BPM | TEMPERATURE: 98 F | BODY MASS INDEX: 30.7 KG/M2

## 2020-02-04 DIAGNOSIS — M47.896 OTHER SPONDYLOSIS, LUMBAR REGION: Primary | ICD-10-CM

## 2020-02-04 PROCEDURE — 99152 MOD SED SAME PHYS/QHP 5/>YRS: CPT | Mod: ,,, | Performed by: ANESTHESIOLOGY

## 2020-02-04 PROCEDURE — 99152 PR MOD CONSCIOUS SEDATION, SAME PHYS, 5+ YRS, FIRST 15 MIN: ICD-10-PCS | Mod: ,,, | Performed by: ANESTHESIOLOGY

## 2020-02-04 PROCEDURE — 64493 INJ PARAVERT F JNT L/S 1 LEV: CPT | Performed by: ANESTHESIOLOGY

## 2020-02-04 PROCEDURE — 64494 INJ PARAVERT F JNT L/S 2 LEV: CPT | Performed by: ANESTHESIOLOGY

## 2020-02-04 PROCEDURE — 64493 INJ PARAVERT F JNT L/S 1 LEV: CPT | Mod: RT,,, | Performed by: ANESTHESIOLOGY

## 2020-02-04 PROCEDURE — 64493 PR INJ DX/THER AGNT PARAVERT FACET JOINT,IMG GUIDE,LUMBAR/SAC,1ST LVL: ICD-10-PCS | Mod: RT,,, | Performed by: ANESTHESIOLOGY

## 2020-02-04 PROCEDURE — 64494 INJ PARAVERT F JNT L/S 2 LEV: CPT | Mod: RT,,, | Performed by: ANESTHESIOLOGY

## 2020-02-04 PROCEDURE — 64494 PR INJ DX/THER AGNT PARAVERT FACET JOINT,IMG GUIDE,LUMBAR/SAC, 2ND LEVEL: ICD-10-PCS | Mod: RT,,, | Performed by: ANESTHESIOLOGY

## 2020-02-04 RX ORDER — BUPIVACAINE HYDROCHLORIDE 5 MG/ML
INJECTION, SOLUTION EPIDURAL; INTRACAUDAL
Status: DISCONTINUED | OUTPATIENT
Start: 2020-02-04 | End: 2020-02-04 | Stop reason: HOSPADM

## 2020-02-04 RX ORDER — SODIUM CHLORIDE 9 MG/ML
INJECTION, SOLUTION INTRAVENOUS CONTINUOUS
Status: DISCONTINUED | OUTPATIENT
Start: 2020-02-04 | End: 2023-03-08

## 2020-02-04 RX ORDER — MIDAZOLAM HYDROCHLORIDE 2 MG/2ML
INJECTION, SOLUTION INTRAMUSCULAR; INTRAVENOUS
Status: DISCONTINUED | OUTPATIENT
Start: 2020-02-04 | End: 2020-02-04 | Stop reason: HOSPADM

## 2020-02-04 RX ORDER — SODIUM CHLORIDE, SODIUM LACTATE, POTASSIUM CHLORIDE, CALCIUM CHLORIDE 600; 310; 30; 20 MG/100ML; MG/100ML; MG/100ML; MG/100ML
INJECTION, SOLUTION INTRAVENOUS ONCE AS NEEDED
Status: COMPLETED | OUTPATIENT
Start: 2020-02-04 | End: 2020-09-23

## 2020-02-04 RX ADMIN — SODIUM CHLORIDE: 9 INJECTION, SOLUTION INTRAVENOUS at 10:02

## 2020-02-04 NOTE — PLAN OF CARE
Stable states ready to go home, kang po fluids, no leg weakness, denies pain, ambulated to car with RN to

## 2020-02-04 NOTE — DISCHARGE SUMMARY
[facility]  Discharge Note  Short Stay    Admit Date: 2/4/2020    Discharge Date and Time: 2/4/2020    Attending Physician: Sergey Perez MD     Discharge Provider: Sergey Perez    Diagnoses:  Active Hospital Problems    Diagnosis  POA    *Other spondylosis, lumbar region [M47.896]  Yes      Resolved Hospital Problems   No resolved problems to display.       Hospital Course: Lumbar MBB  Discharged Condition: Good    Final Diagnoses:   Active Hospital Problems    Diagnosis  POA    *Other spondylosis, lumbar region [M47.896]  Yes      Resolved Hospital Problems   No resolved problems to display.       Disposition: Home or Self Care    Follow up/Patient Instructions:    Medications:  Reconciled Home Medications:      Medication List      CONTINUE taking these medications    buPROPion 150 MG TB24 tablet  Commonly known as:  WELLBUTRIN XL  Take 150 mg by mouth once daily.     ergocalciferol 50,000 unit Cap  Commonly known as:  ERGOCALCIFEROL  Take 1 capsule (50,000 Units total) by mouth every 7 days.     estradiol 0.5 MG tablet  Commonly known as:  ESTRACE  Take 1 tablet (0.5 mg total) by mouth once daily.     lisinopril 20 MG tablet  Commonly known as:  PRINIVIL,ZESTRIL  Take 1 tablet by mouth once daily.     magnesium L-lactate 84 mg Tbsr  Commonly known as:  MAGTAB  Take 1 tablet (84 mg total) by mouth once daily.     omeprazole 40 MG capsule  Commonly known as:  PRILOSEC  Take 1 capsule by mouth daily as needed.     rosuvastatin 10 MG tablet  Commonly known as:  CRESTOR  Take 1 tablet (10 mg total) by mouth once daily.     * Anoro Ellipta 62.5-25 mcg/actuation Dsdv  Generic drug:  umeclidinium-vilanterol  Inhale into the lungs. Controller     * umeclidinium-vilanterol 62.5-25 mcg/actuation Dsdv  Commonly known as:  Anoro Ellipta  Inhale 1 puff into the lungs once daily.     * umeclidinium-vilanterol 62.5-25 mcg/actuation Dsdv  Commonly known as:  Anoro Ellipta  Inhale 1 puff into the lungs once daily.      Ventolin HFA 90 mcg/actuation inhaler  Generic drug:  albuterol  Inhale 2 puffs into the lungs every 4 (four) hours as needed.         * This list has 3 medication(s) that are the same as other medications prescribed for you. Read the directions carefully, and ask your doctor or other care provider to review them with you.              Discharge Procedure Orders   Call MD for:  temperature >100.4     Call MD for:  persistent nausea and vomiting or diarrhea     Call MD for:  severe uncontrolled pain     Call MD for:  redness, tenderness, or signs of infection (pain, swelling, redness, odor or green/yellow discharge around incision site)     Call MD for:  difficulty breathing or increased cough     Call MD for:  severe persistent headache        Follow up with MD in 2-3 weeks    Discharge Procedure Orders (must include Diet, Follow-up, Activity):   Discharge Procedure Orders (must include Diet, Follow-up, Activity)   Call MD for:  temperature >100.4     Call MD for:  persistent nausea and vomiting or diarrhea     Call MD for:  severe uncontrolled pain     Call MD for:  redness, tenderness, or signs of infection (pain, swelling, redness, odor or green/yellow discharge around incision site)     Call MD for:  difficulty breathing or increased cough     Call MD for:  severe persistent headache

## 2020-02-04 NOTE — OP NOTE
PROCEDURE DATE: 2/4/2020    PROCEDURE:  Right L3,4,5 medial branch nerve blocks (corresponds to right L4/5 and L5/S1 facets)    DIAGNOSIS:  Other lumbar spondylosis    Post Op diagnosis: Same    PHYSICIAN: Sergey Perez MD    MEDICATIONS INJECTED: 0.5% bupivicaine, 0.5 ml at each level    SEDATION MEDICATIONS:RN IV Versed    LOCAL ANESTHETIC USED: None    ESTIMATED BLOOD LOSS:  NOne    COMPLICATIONS:  None    TECHNIQUE: A time out was taken to identify the patient, procedure and side of the procedure. The patient was placed in a prone position, then prepped and draped in the usual sterile fashion using ChloraPrep and sterile towels.  The levels were determined under fluoroscopic guidance and then marked.  A 25-gauge 3.5 inch needle was introduced to the anatomic location of the L3,4,5 medial branch nerves on the right side. The above medication was then injected. The patient tolerated the procedure well.     The patient was monitored after the procedure. Patient was given pain diary to record pain levels at home.     If found to have greater than a 50% recovery and so will be scheduled for a radiofrequency ablation of the corresponding nerves.  Patient was given post procedure and discharge instructions to follow at home.  The patient was discharged in a stable condition.

## 2020-02-07 ENCOUNTER — TELEPHONE (OUTPATIENT)
Dept: PAIN MEDICINE | Facility: CLINIC | Age: 60
End: 2020-02-07

## 2020-02-07 ENCOUNTER — TELEPHONE (OUTPATIENT)
Dept: FAMILY MEDICINE | Facility: CLINIC | Age: 60
End: 2020-02-07

## 2020-02-07 DIAGNOSIS — M47.816 SPONDYLOSIS OF LUMBAR SPINE: Primary | ICD-10-CM

## 2020-02-07 NOTE — TELEPHONE ENCOUNTER
Pt stated she did really well with the MMB , better than 80% decrease in pain. Scheduled RFA for 2/12

## 2020-02-07 NOTE — TELEPHONE ENCOUNTER
----- Message from Melisa Pope sent at 2/7/2020 10:03 AM CST -----  Contact: pt  Type: Needs Medical Advice    Who Called:  pt  Best Call Back Number: [phone]  Additional Information: Requesting a call back regarding pt called in regarding a refill on medmagnesium L-lactate (MAGTAB) 84 mg TbSR, pt stated she [facility].  Can you resend the script?  Please Advise ---Thank you

## 2020-02-07 NOTE — TELEPHONE ENCOUNTER
----- Message from Margret Adams sent at 2/7/2020  9:56 AM CST -----  Contact: Josseline gonzalez  Type: Needs Medical Advice    Who Called:  Josseline Mariano Call Back Number: [phone]  Additional Information: pls call pt regarding her info on her pain diary

## 2020-02-12 ENCOUNTER — HOSPITAL ENCOUNTER (OUTPATIENT)
Facility: AMBULARY SURGERY CENTER | Age: 60
Discharge: HOME | End: 2020-02-12
Attending: ANESTHESIOLOGY | Admitting: ANESTHESIOLOGY
Payer: COMMERCIAL

## 2020-02-12 DIAGNOSIS — M47.896 OTHER SPONDYLOSIS, LUMBAR REGION: Primary | ICD-10-CM

## 2020-02-12 PROCEDURE — 64635 DESTROY LUMB/SAC FACET JNT: CPT | Mod: RT,,, | Performed by: ANESTHESIOLOGY

## 2020-02-12 PROCEDURE — 64635 PR DESTROY LUMB/SAC FACET JNT: ICD-10-PCS | Mod: RT,,, | Performed by: ANESTHESIOLOGY

## 2020-02-12 PROCEDURE — 64635 DESTROY LUMB/SAC FACET JNT: CPT | Performed by: ANESTHESIOLOGY

## 2020-02-12 PROCEDURE — 64636 PR DESTROY L/S FACET JNT ADDL: ICD-10-PCS | Mod: RT,,, | Performed by: ANESTHESIOLOGY

## 2020-02-12 PROCEDURE — 64636 DESTROY L/S FACET JNT ADDL: CPT | Performed by: ANESTHESIOLOGY

## 2020-02-12 PROCEDURE — 99152 PR MOD CONSCIOUS SEDATION, SAME PHYS, 5+ YRS, FIRST 15 MIN: ICD-10-PCS | Mod: ,,, | Performed by: ANESTHESIOLOGY

## 2020-02-12 PROCEDURE — 64636 DESTROY L/S FACET JNT ADDL: CPT | Mod: RT,,, | Performed by: ANESTHESIOLOGY

## 2020-02-12 PROCEDURE — 99152 MOD SED SAME PHYS/QHP 5/>YRS: CPT | Mod: ,,, | Performed by: ANESTHESIOLOGY

## 2020-02-12 RX ORDER — BUPIVACAINE HYDROCHLORIDE 2.5 MG/ML
INJECTION, SOLUTION EPIDURAL; INFILTRATION; INTRACAUDAL
Status: DISCONTINUED
Start: 2020-02-12 | End: 2020-02-12 | Stop reason: HOSPADM

## 2020-02-12 RX ORDER — FENTANYL CITRATE 50 UG/ML
INJECTION, SOLUTION INTRAMUSCULAR; INTRAVENOUS
Status: DISCONTINUED | OUTPATIENT
Start: 2020-02-12 | End: 2020-02-12 | Stop reason: HOSPADM

## 2020-02-12 RX ORDER — METHYLPREDNISOLONE ACETATE 80 MG/ML
INJECTION, SUSPENSION INTRA-ARTICULAR; INTRALESIONAL; INTRAMUSCULAR; SOFT TISSUE
Status: DISCONTINUED | OUTPATIENT
Start: 2020-02-12 | End: 2020-02-12 | Stop reason: HOSPADM

## 2020-02-12 RX ORDER — BUPIVACAINE HYDROCHLORIDE 2.5 MG/ML
INJECTION, SOLUTION EPIDURAL; INFILTRATION; INTRACAUDAL
Status: DISCONTINUED | OUTPATIENT
Start: 2020-02-12 | End: 2020-02-12 | Stop reason: HOSPADM

## 2020-02-12 RX ORDER — SODIUM CHLORIDE, SODIUM LACTATE, POTASSIUM CHLORIDE, CALCIUM CHLORIDE 600; 310; 30; 20 MG/100ML; MG/100ML; MG/100ML; MG/100ML
INJECTION, SOLUTION INTRAVENOUS ONCE AS NEEDED
Status: DISCONTINUED | OUTPATIENT
Start: 2020-02-12 | End: 2020-02-12 | Stop reason: HOSPADM

## 2020-02-12 RX ORDER — MIDAZOLAM HYDROCHLORIDE 2 MG/2ML
INJECTION, SOLUTION INTRAMUSCULAR; INTRAVENOUS
Status: DISCONTINUED | OUTPATIENT
Start: 2020-02-12 | End: 2020-02-12 | Stop reason: HOSPADM

## 2020-02-12 RX ORDER — LIDOCAINE HYDROCHLORIDE 20 MG/ML
INJECTION, SOLUTION EPIDURAL; INFILTRATION; INTRACAUDAL; PERINEURAL
Status: DISCONTINUED | OUTPATIENT
Start: 2020-02-12 | End: 2020-02-12 | Stop reason: HOSPADM

## 2020-02-12 RX ORDER — LIDOCAINE HYDROCHLORIDE 10 MG/ML
INJECTION, SOLUTION EPIDURAL; INFILTRATION; INTRACAUDAL; PERINEURAL
Status: DISCONTINUED | OUTPATIENT
Start: 2020-02-12 | End: 2020-02-12 | Stop reason: HOSPADM

## 2020-02-12 RX ORDER — SODIUM CHLORIDE 9 MG/ML
INJECTION, SOLUTION INTRAVENOUS CONTINUOUS
Status: DISCONTINUED | OUTPATIENT
Start: 2020-02-12 | End: 2020-02-12 | Stop reason: HOSPADM

## 2020-02-12 RX ADMIN — SODIUM CHLORIDE: 9 INJECTION, SOLUTION INTRAVENOUS at 02:02

## 2020-02-12 NOTE — DISCHARGE INSTRUCTIONS
Before leaving, please make sure you have all your personal belongings such as glasses, purses, wallets, keys, cell phones, jewelry, jacket     RADIOFREQUENCY/Pain injection instructions:     This procedure may take a several weeks to relieve pain. You may get some pain relief from the local anesthetic initally.    No driving for 24 hrs.   Activity as tolerated- gradually increase activities.  Dont lift over 10 lbs for 24 hrs   No heat at injection sites x 2 days. No heating pads, hot tubs, saunas, or swimming in any body of water or pool for 2 days.  Use ice pack for mild swelling and for comfort , apply for 20 minutes, remove for 20 minute intervals. No direct contact of ice itself  to skin.  May shower today. Do not allow shower water to beat on injection sites for 2 days. No tub baths for two days.      Resume Aspirin, Plavix, or Coumadin the day after the procedure unless otherwise instructed.   If diabetic,monitor your glucose carefully as steroids can increase your glucose level    Seek immediate medical help for:   Severe increase in your usual pain or appearance of new pain.  Prolonged (more than 8 hours) or increasing weakness or numbness in the legs or arms. Numbing medicine was injected and can affect the messages to and from the brain and legs or arms.  .    Fever above 100.4 degrees F ,Drainage,redness,active bleeding, or increased swelling at the injection site.  Headache, shortness of breath, chest pain, or breathing problems.    Radiofrequency of Nerves  After this procedure you may have increased pain for three days and lingering pain for up to 6 weeks.   Most patients feel better after 4-6  weeks.    Use your pain medications as needed but the goal of this treartment is to wean you off your pain medication.  You may have weakness after the procedure due to the numbing injection.  You may feel a sunburn effect and some patients may need a burn cream for the skin, but only apply after 2 days after  procedure.    Use ice pack for discomfort :apply for 20 minutes, remove for 20 minutes for up to 2 days. Do not sleep with ice pack.  Do not use a heating pad or take tub baths or swim for 2 days.(see above directions)  You may shower today( see above directions)  Gradually increase your activities.  Dont lift anything over 10 lbs for the first 24 hours  Dont drive the day of the procedure Wait 24 hrs to drive.  Wait until tomorrow to resume any blood thinners (aspirin, Plavix, Coumadin) but you may resume all your other medications today.  If Diabetic, monitor you glucose carefully due to steroids  used for this procedure    Seek Medical Attention if you have:  Severe pain or headache  Fever or chills  Redness or swelling around the injection site   Difficulty breathing  Vomiting or Increasing numbness or weakness in arms or legs    After Surgery:  Always be aware that any surgery can cause these symptoms:    Pain- Medication can be prescribed for pain to decrease your pain but may not completely take your pain away.  Over the Counter pain medicine my be enough and you can always use Ice and rest to help ease pain.    Bleeding- a little bleeding after a surgery is usually within normal.  If there is a lot of blood you need to notify your MD.  Emergency treatments of bleeding are cold application, elevation of the bleeding site and compression.    Infection- Infection after surgery is NOT a normal occurrence.  Signs of infection are fever, swelling, hot to touch the incision.  If this occurs notify your MD immediately.    Nausea- this can be common after a surgery especially if you have had anesthesia medicine or are taking pain medicine. The steroid the Dr injected can have a side effect of Nausea.  Staying on clear liquids, bland foods, gingerale, or over the counter anti nausea medicines can help.  If you vomit more than once, notify your MD.  Anti Nausea medicines can be prescribed.

## 2020-02-12 NOTE — DISCHARGE SUMMARY
[facility]  Discharge Note  Short Stay    Admit Date: 2/12/2020    Discharge Date and Time: 2/12/2020    Attending Physician: Sergey Perez MD     Discharge Provider: Sergey Perez    Diagnoses:  Active Hospital Problems    Diagnosis  POA    *Other spondylosis, lumbar region [M47.896]  Yes      Resolved Hospital Problems   No resolved problems to display.       Hospital Course: Lumbar MB RFA  Discharged Condition: Good    Final Diagnoses:   Active Hospital Problems    Diagnosis  POA    *Other spondylosis, lumbar region [M47.896]  Yes      Resolved Hospital Problems   No resolved problems to display.       Disposition: Home or Self Care    Follow up/Patient Instructions:    Medications:  Reconciled Home Medications:      Medication List      CONTINUE taking these medications    buPROPion 150 MG TB24 tablet  Commonly known as:  WELLBUTRIN XL  Take 150 mg by mouth once daily.     ergocalciferol 50,000 unit Cap  Commonly known as:  ERGOCALCIFEROL  Take 1 capsule (50,000 Units total) by mouth every 7 days.     estradiol 0.5 MG tablet  Commonly known as:  ESTRACE  Take 1 tablet (0.5 mg total) by mouth once daily.     lisinopril 20 MG tablet  Commonly known as:  PRINIVIL,ZESTRIL  Take 1 tablet by mouth once daily.     magnesium L-lactate 84 mg Tbsr  Commonly known as:  MAGTAB  Take 1 tablet (84 mg total) by mouth once daily.     omeprazole 40 MG capsule  Commonly known as:  PRILOSEC  Take 1 capsule by mouth daily as needed.     rosuvastatin 10 MG tablet  Commonly known as:  CRESTOR  Take 1 tablet (10 mg total) by mouth once daily.     * Anoro Ellipta 62.5-25 mcg/actuation Dsdv  Generic drug:  umeclidinium-vilanterol  Inhale into the lungs. Controller     * umeclidinium-vilanterol 62.5-25 mcg/actuation Dsdv  Commonly known as:  Anoro Ellipta  Inhale 1 puff into the lungs once daily.     * umeclidinium-vilanterol 62.5-25 mcg/actuation Dsdv  Commonly known as:  Anoro Ellipta  Inhale 1 puff into the lungs once daily.      Ventolin HFA 90 mcg/actuation inhaler  Generic drug:  albuterol  Inhale 2 puffs into the lungs every 4 (four) hours as needed.         * This list has 3 medication(s) that are the same as other medications prescribed for you. Read the directions carefully, and ask your doctor or other care provider to review them with you.              Discharge Procedure Orders   Call MD for:  temperature >100.4     Call MD for:  persistent nausea and vomiting or diarrhea     Call MD for:  severe uncontrolled pain     Call MD for:  redness, tenderness, or signs of infection (pain, swelling, redness, odor or green/yellow discharge around incision site)     Call MD for:  difficulty breathing or increased cough     Call MD for:  severe persistent headache        Follow up with MD in 2-3 weeks    Discharge Procedure Orders (must include Diet, Follow-up, Activity):   Discharge Procedure Orders (must include Diet, Follow-up, Activity)   Call MD for:  temperature >100.4     Call MD for:  persistent nausea and vomiting or diarrhea     Call MD for:  severe uncontrolled pain     Call MD for:  redness, tenderness, or signs of infection (pain, swelling, redness, odor or green/yellow discharge around incision site)     Call MD for:  difficulty breathing or increased cough     Call MD for:  severe persistent headache

## 2020-02-12 NOTE — PLAN OF CARE
"Patient awake alert and sitting in chair.  She is Able to stand up from chair and ambulate to transfer to wheelchair with very minimal assistance. Patient states she is ready to go home. Injection sites and  vital signs are stable. Patient denies pain, nausea dizziness or weakness, though states she is  "A little tired". Patient's spouse chairside and states he is ready to take patient home and he is driving. All belongings listed on pre op checklist have been returned to patient.  "

## 2020-02-13 VITALS
OXYGEN SATURATION: 94 % | WEIGHT: 191.81 LBS | HEIGHT: 66 IN | SYSTOLIC BLOOD PRESSURE: 132 MMHG | DIASTOLIC BLOOD PRESSURE: 88 MMHG | BODY MASS INDEX: 30.82 KG/M2 | RESPIRATION RATE: 18 BRPM | TEMPERATURE: 98 F | HEART RATE: 75 BPM

## 2020-03-03 ENCOUNTER — LAB VISIT (OUTPATIENT)
Dept: LAB | Facility: HOSPITAL | Age: 60
End: 2020-03-03
Attending: FAMILY MEDICINE
Payer: COMMERCIAL

## 2020-03-03 DIAGNOSIS — Z12.11 COLON CANCER SCREENING: ICD-10-CM

## 2020-03-03 PROCEDURE — 82274 ASSAY TEST FOR BLOOD FECAL: CPT

## 2020-03-09 ENCOUNTER — LAB VISIT (OUTPATIENT)
Dept: LAB | Facility: HOSPITAL | Age: 60
End: 2020-03-09
Attending: INTERNAL MEDICINE
Payer: COMMERCIAL

## 2020-03-09 ENCOUNTER — OFFICE VISIT (OUTPATIENT)
Dept: GASTROENTEROLOGY | Facility: CLINIC | Age: 60
End: 2020-03-09
Payer: COMMERCIAL

## 2020-03-09 VITALS
HEART RATE: 78 BPM | BODY MASS INDEX: 30.05 KG/M2 | WEIGHT: 187 LBS | OXYGEN SATURATION: 95 % | DIASTOLIC BLOOD PRESSURE: 78 MMHG | SYSTOLIC BLOOD PRESSURE: 119 MMHG | RESPIRATION RATE: 16 BRPM | HEIGHT: 66 IN

## 2020-03-09 DIAGNOSIS — Z12.11 COLON CANCER SCREENING: ICD-10-CM

## 2020-03-09 DIAGNOSIS — R19.7 DIARRHEA, UNSPECIFIED TYPE: ICD-10-CM

## 2020-03-09 DIAGNOSIS — R10.11 RUQ PAIN: ICD-10-CM

## 2020-03-09 DIAGNOSIS — Z86.010 HISTORY OF COLON POLYPS: ICD-10-CM

## 2020-03-09 DIAGNOSIS — R10.11 RUQ PAIN: Primary | ICD-10-CM

## 2020-03-09 DIAGNOSIS — Z80.0 FAMILY HISTORY OF COLON CANCER: ICD-10-CM

## 2020-03-09 DIAGNOSIS — K21.9 GASTROESOPHAGEAL REFLUX DISEASE, ESOPHAGITIS PRESENCE NOT SPECIFIED: ICD-10-CM

## 2020-03-09 PROBLEM — Z86.0100 HISTORY OF COLON POLYPS: Status: ACTIVE | Noted: 2020-03-09

## 2020-03-09 PROCEDURE — 3078F PR MOST RECENT DIASTOLIC BLOOD PRESSURE < 80 MM HG: ICD-10-PCS | Mod: CPTII,S$GLB,, | Performed by: INTERNAL MEDICINE

## 2020-03-09 PROCEDURE — 99204 PR OFFICE/OUTPT VISIT, NEW, LEVL IV, 45-59 MIN: ICD-10-PCS | Mod: S$GLB,,, | Performed by: INTERNAL MEDICINE

## 2020-03-09 PROCEDURE — 99999 PR PBB SHADOW E&M-EST. PATIENT-LVL III: ICD-10-PCS | Mod: PBBFAC,,, | Performed by: INTERNAL MEDICINE

## 2020-03-09 PROCEDURE — 3078F DIAST BP <80 MM HG: CPT | Mod: CPTII,S$GLB,, | Performed by: INTERNAL MEDICINE

## 2020-03-09 PROCEDURE — 99999 PR PBB SHADOW E&M-EST. PATIENT-LVL III: CPT | Mod: PBBFAC,,, | Performed by: INTERNAL MEDICINE

## 2020-03-09 PROCEDURE — 99204 OFFICE O/P NEW MOD 45 MIN: CPT | Mod: S$GLB,,, | Performed by: INTERNAL MEDICINE

## 2020-03-09 PROCEDURE — 3074F SYST BP LT 130 MM HG: CPT | Mod: CPTII,S$GLB,, | Performed by: INTERNAL MEDICINE

## 2020-03-09 PROCEDURE — 3008F PR BODY MASS INDEX (BMI) DOCUMENTED: ICD-10-PCS | Mod: CPTII,S$GLB,, | Performed by: INTERNAL MEDICINE

## 2020-03-09 PROCEDURE — 3008F BODY MASS INDEX DOCD: CPT | Mod: CPTII,S$GLB,, | Performed by: INTERNAL MEDICINE

## 2020-03-09 PROCEDURE — 83516 IMMUNOASSAY NONANTIBODY: CPT | Mod: 59

## 2020-03-09 PROCEDURE — 36415 COLL VENOUS BLD VENIPUNCTURE: CPT

## 2020-03-09 PROCEDURE — 3074F PR MOST RECENT SYSTOLIC BLOOD PRESSURE < 130 MM HG: ICD-10-PCS | Mod: CPTII,S$GLB,, | Performed by: INTERNAL MEDICINE

## 2020-03-09 RX ORDER — SUCRALFATE 1 G/1
1 TABLET ORAL
Qty: 360 TABLET | Refills: 3 | Status: SHIPPED | OUTPATIENT
Start: 2020-03-09 | End: 2020-11-02 | Stop reason: SDUPTHER

## 2020-03-09 NOTE — PROGRESS NOTES
Subjective:       Patient ID: Josseline Degroot is a 60 y.o. female.    Chief Complaint: Gastroesophageal Reflux (pain in upper part of stomach and sometimes the burning comes up into the throat.) and Colonoscopy    She complains of right upper quadrant discomfort.  She complains of pyrosis dyspepsia regurgitation.  Sometimes at night she will have regurgitation with contents in the mouth.  She denies aspiration or dysphagia.  She denies hematemesis hematochezia jaundice or bleeding.  Her symptoms started approximately 2 years ago and she states now that medicine does a were .  She denies a precipitating factor such is a specific food or medication.  She stop smoking a couple years ago.  She has frequent bowel movements that her semi solid without incontinence and her not nocturnal.  Her family history reveals that the father had bladder and colon cancer with a partial colectomy.  He  of lung cancer.  There other types of cancer in the family although she cannot be sure.  Ten years ago she had a colonoscopy which was normal. Five years ago she had a colonoscopy that showed polyps.        Allergies:  Review of patient's allergies indicates:  No Known Allergies    Medications:    Current Outpatient Medications:     buPROPion (WELLBUTRIN XL) 150 MG TB24 tablet, Take 150 mg by mouth once daily., Disp: , Rfl:     ergocalciferol (ERGOCALCIFEROL) 50,000 unit Cap, Take 1 capsule (50,000 Units total) by mouth every 7 days., Disp: 4 capsule, Rfl: 2    estradiol (ESTRACE) 0.5 MG tablet, Take 1 tablet (0.5 mg total) by mouth once daily., Disp: 90 tablet, Rfl: 3    lisinopril (PRINIVIL,ZESTRIL) 20 MG tablet, Take 1 tablet by mouth once daily., Disp: , Rfl:     magnesium L-lactate (MAGTAB) 84 mg TbSR, Take 1 tablet (84 mg total) by mouth once daily., Disp: 30 tablet, Rfl: 0    omeprazole (PRILOSEC) 40 MG capsule, Take 1 capsule by mouth daily as needed., Disp: , Rfl:     rosuvastatin (CRESTOR) 10 MG tablet, Take 1 tablet  (10 mg total) by mouth once daily., Disp: 90 tablet, Rfl: 3    umeclidinium-vilanterol (ANORO ELLIPTA) 62.5-25 mcg/actuation DsDv, Inhale 1 puff into the lungs once daily., Disp: 3 each, Rfl: 3    VENTOLIN HFA 90 mcg/actuation inhaler, Inhale 2 puffs into the lungs every 4 (four) hours as needed., Disp: , Rfl: 0    sucralfate (CARAFATE) 1 gram tablet, Take 1 tablet (1 g total) by mouth 4 (four) times daily before meals and nightly., Disp: 360 tablet, Rfl: 3  No current facility-administered medications for this visit.     Facility-Administered Medications Ordered in Other Visits:     0.9%  NaCl infusion, , Intravenous, Continuous, Sergey Perez MD, Stopped at 02/04/20 1110    lactated ringers infusion, , Intravenous, Once PRN, Sergey Perez MD    Past Medical History:   Diagnosis Date    Colon polyp     COPD (chronic obstructive pulmonary disease)     Depression     GERD (gastroesophageal reflux disease)     Hyperlipidemia     Hypertension        Past Surgical History:   Procedure Laterality Date    COLONOSCOPY      HYSTERECTOMY      TOTAL    INJECTION OF ANESTHETIC AGENT AROUND MEDIAL BRANCH NERVES INNERVATING LUMBAR FACET JOINT Right 2/4/2020    Procedure: Block-nerve-medial branch-lumbar;  Surgeon: Sergey Perez MD;  Location: [facility] OR;  Service: Pain Management;  Laterality: Right;  L3,4,5    RADIOFREQUENCY ABLATION OF LUMBAR MEDIAL BRANCH NERVE AT SINGLE LEVEL Right 2/12/2020    Procedure: Radiofrequency Ablation, Nerve, Spinal, Lumbar, Medial Branch, 1 Level;  Surgeon: Sergey Perez MD;  Location: [facility] OR;  Service: Pain Management;  Laterality: Right;  L3,4,5         Review of Systems   Constitutional: Negative for appetite change, fever and unexpected weight change.   HENT: Negative for trouble swallowing.         No jaundice.   Respiratory: Negative for cough, shortness of breath and wheezing.         She is a former cigarette smoker.  She denies aspiration or hemoptysis.  She denies significant  sputum production or cough.  She is physically active and denies dyspnea on exertion.   Cardiovascular: Negative for chest pain.        Her hyperlipidemia is well controlled.  She has a family history of heart disease. She denies exertional chest pain or rhythm disturbance.   Gastrointestinal: Positive for abdominal pain, diarrhea and nausea. Negative for anal bleeding, blood in stool and constipation.        She has occasional nausea without vomiting. She denies a precipitating factor.  Her family history is negative for gallbladder disease.   Musculoskeletal: Positive for arthralgias and back pain. Negative for neck pain.        She is going to the Pain Clinic.  Her back pain has improved so that she can be more physically active.   Skin: Negative for pallor and rash.   Neurological: Negative for dizziness, seizures, syncope, speech difficulty, weakness and numbness.   Hematological: Negative for adenopathy.   Psychiatric/Behavioral: Negative for confusion.       Objective:      Physical Exam   Constitutional: She is oriented to person, place, and time. She appears well-developed and well-nourished.   Well-nourished well-hydrated nonicteric white female.  She is oriented x3.  She can relate her history and answer questions appropriately.   HENT:   Head: Normocephalic.   Eyes: Pupils are equal, round, and reactive to light. EOM are normal.   Neck: Normal range of motion. Neck supple. No tracheal deviation present. No thyromegaly present.   Cardiovascular: Normal rate, regular rhythm and normal heart sounds.   Pulmonary/Chest: Effort normal and breath sounds normal.   Abdominal: Soft. Bowel sounds are normal. She exhibits no distension and no mass. There is tenderness. There is no rebound and no guarding. No hernia.   The abdomen is soft there is tenderness in left upper quadrant.  Organomegaly masses are not detected.  Bowel sounds are normal.   Musculoskeletal: Normal range of motion.   She can ambulate normally.   She can go from the sitting the standing position without difficulty.  She can get on the exam table without difficulty or assistance.   Lymphadenopathy:     She has no cervical adenopathy.   Neurological: She is alert and oriented to person, place, and time. No cranial nerve deficit.   Skin: Skin is warm and dry.   Psychiatric: She has a normal mood and affect. Her behavior is normal.   Vitals reviewed.        Plan:       RUQ pain  -     US Abdomen Complete; Future; Expected date: 03/09/2020  -     FL Upper GI With KUB; Future; Expected date: 03/09/2020  -     Pancreatic elastase, fecal; Future; Expected date: 03/09/2020  -     Celiac Disease Panel; Future; Expected date: 03/09/2020    Colon cancer screening  -     Fecal Immunochemical Test (iFOBT); Future; Expected date: 03/09/2020    Gastroesophageal reflux disease, esophagitis presence not specified  -     sucralfate (CARAFATE) 1 gram tablet; Take 1 tablet (1 g total) by mouth 4 (four) times daily before meals and nightly.  Dispense: 360 tablet; Refill: 3    Diarrhea, unspecified type      she will continue her reflux regimen current medications vitamins and minerals.  She started on the Carafate.  She follows up Pain Clinic.  Gastrointestinal evaluation is in progress.  She will make a food diary and avoid the offending foods.

## 2020-03-09 NOTE — PATIENT INSTRUCTIONS
Ultrasound the upper abdomen and upper GI x-rays will be obtained.  She continues her reflux regimen.  She started on the Carafate 1 g before meals.  She will make a food diary and avoid the offending foods.  Stool studies will be be obtained.

## 2020-03-10 ENCOUNTER — LAB VISIT (OUTPATIENT)
Dept: LAB | Facility: HOSPITAL | Age: 60
End: 2020-03-10
Attending: INTERNAL MEDICINE
Payer: COMMERCIAL

## 2020-03-10 DIAGNOSIS — R10.11 RUQ PAIN: ICD-10-CM

## 2020-03-10 DIAGNOSIS — Z12.11 COLON CANCER SCREENING: ICD-10-CM

## 2020-03-10 PROCEDURE — 82656 EL-1 FECAL QUAL/SEMIQ: CPT

## 2020-03-11 LAB
GLIADIN PEPTIDE IGA SER-ACNC: 4 UNITS
GLIADIN PEPTIDE IGG SER-ACNC: 2 UNITS
IGA SERPL-MCNC: 65 MG/DL (ref 70–400)
TTG IGA SER-ACNC: 4 UNITS
TTG IGG SER-ACNC: 2 UNITS

## 2020-03-13 LAB — HEMOCCULT STL QL IA: NEGATIVE

## 2020-03-14 ENCOUNTER — PATIENT MESSAGE (OUTPATIENT)
Dept: FAMILY MEDICINE | Facility: CLINIC | Age: 60
End: 2020-03-14

## 2020-03-16 ENCOUNTER — PATIENT MESSAGE (OUTPATIENT)
Dept: FAMILY MEDICINE | Facility: CLINIC | Age: 60
End: 2020-03-16

## 2020-03-17 ENCOUNTER — HOSPITAL ENCOUNTER (OUTPATIENT)
Dept: RADIOLOGY | Facility: HOSPITAL | Age: 60
Discharge: HOME | End: 2020-03-17
Attending: INTERNAL MEDICINE
Payer: COMMERCIAL

## 2020-03-17 DIAGNOSIS — R10.11 RUQ PAIN: ICD-10-CM

## 2020-03-17 PROCEDURE — 74240 FL UPPER GI W KUB: ICD-10-PCS | Mod: 26,,, | Performed by: RADIOLOGY

## 2020-03-17 PROCEDURE — 74240 X-RAY XM UPR GI TRC 1CNTRST: CPT | Mod: TC,FY

## 2020-03-17 PROCEDURE — 76700 US EXAM ABDOM COMPLETE: CPT | Mod: TC

## 2020-03-17 PROCEDURE — 76700 US EXAM ABDOM COMPLETE: CPT | Mod: 26,,, | Performed by: RADIOLOGY

## 2020-03-17 PROCEDURE — 74240 X-RAY XM UPR GI TRC 1CNTRST: CPT | Mod: 26,,, | Performed by: RADIOLOGY

## 2020-03-17 PROCEDURE — 76700 US ABDOMEN COMPLETE: ICD-10-PCS | Mod: 26,,, | Performed by: RADIOLOGY

## 2020-03-17 PROCEDURE — A9698 NON-RAD CONTRAST MATERIALNOC: HCPCS | Performed by: INTERNAL MEDICINE

## 2020-03-17 PROCEDURE — 25500020 PHARM REV CODE 255: Performed by: INTERNAL MEDICINE

## 2020-03-17 RX ADMIN — BARIUM SULFATE 140 ML: 980 POWDER, FOR SUSPENSION ORAL at 10:03

## 2020-03-18 LAB — ELASTASE 1, FECAL: >500 MCG/G

## 2020-03-18 RX ORDER — LISINOPRIL 20 MG/1
20 TABLET ORAL DAILY
Qty: 90 TABLET | Refills: 3 | Status: SHIPPED | OUTPATIENT
Start: 2020-03-18 | End: 2020-03-18 | Stop reason: SDUPTHER

## 2020-03-18 RX ORDER — LISINOPRIL 20 MG/1
20 TABLET ORAL DAILY
Qty: 90 TABLET | Refills: 3 | Status: SHIPPED | OUTPATIENT
Start: 2020-03-18 | End: 2020-11-02 | Stop reason: SDUPTHER

## 2020-03-23 ENCOUNTER — TELEPHONE (OUTPATIENT)
Dept: FAMILY MEDICINE | Facility: CLINIC | Age: 60
End: 2020-03-23

## 2020-03-23 NOTE — TELEPHONE ENCOUNTER
----- Message from Lori Cool MD sent at 3/18/2020  3:37 PM CDT -----  Please let patient know that her yearly screen for colon cancer was negative. She needs a repeat in one year.

## 2020-04-01 ENCOUNTER — PATIENT MESSAGE (OUTPATIENT)
Dept: FAMILY MEDICINE | Facility: CLINIC | Age: 60
End: 2020-04-01

## 2020-04-16 ENCOUNTER — OFFICE VISIT (OUTPATIENT)
Dept: FAMILY MEDICINE | Facility: CLINIC | Age: 60
End: 2020-04-16
Payer: COMMERCIAL

## 2020-04-16 DIAGNOSIS — R05.9 COUGH: ICD-10-CM

## 2020-04-16 DIAGNOSIS — R06.02 SHORTNESS OF BREATH: Primary | ICD-10-CM

## 2020-04-16 LAB — SARS-COV-2 RNA RESP QL NAA+PROBE: NOT DETECTED

## 2020-04-16 PROCEDURE — 99213 OFFICE O/P EST LOW 20 MIN: CPT | Mod: 95,,, | Performed by: FAMILY MEDICINE

## 2020-04-16 PROCEDURE — U0002 COVID-19 LAB TEST NON-CDC: HCPCS

## 2020-04-16 PROCEDURE — 99213 PR OFFICE/OUTPT VISIT, EST, LEVL III, 20-29 MIN: ICD-10-PCS | Mod: 95,,, | Performed by: FAMILY MEDICINE

## 2020-04-16 NOTE — PROGRESS NOTES
The patient location is: [place]  The chief complaint leading to consultation is: viral URI sx  Visit type: audio only  Total time spent with patient: 15 min  Each patient to whom he or she provides medical services by telemedicine is:  (1) informed of the relationship between the physician and patient and the respective role of any other health care provider with respect to management of the patient; and (2) notified that he or she may decline to receive medical services by telemedicine and may withdraw from such care at any time.    Notes:   Woke up yesterday and wasn't feeling good. Was having shortness of breath. Didn't think much about it, but woke up with a sore throat and just feeling weak and bad all over. Is having a dry, irritating cough. Does not both her too much. Having a runny nose, sneezing. Mild headache. No nausea/vomiting/diarrhea. Feeling a little light headed. No fever.     DX: Viral URI with cough    COVID testing ordered, negative.   Supportive care.  Follow up if sx do not improve

## 2020-04-25 ENCOUNTER — PATIENT MESSAGE (OUTPATIENT)
Dept: FAMILY MEDICINE | Facility: CLINIC | Age: 60
End: 2020-04-25

## 2020-04-26 ENCOUNTER — HOSPITAL ENCOUNTER (EMERGENCY)
Facility: HOSPITAL | Age: 60
Discharge: HOME | End: 2020-04-26
Attending: FAMILY MEDICINE
Payer: COMMERCIAL

## 2020-04-26 VITALS
HEART RATE: 77 BPM | WEIGHT: 191 LBS | RESPIRATION RATE: 16 BRPM | DIASTOLIC BLOOD PRESSURE: 74 MMHG | OXYGEN SATURATION: 98 % | TEMPERATURE: 98 F | HEIGHT: 66 IN | BODY MASS INDEX: 30.7 KG/M2 | SYSTOLIC BLOOD PRESSURE: 113 MMHG

## 2020-04-26 DIAGNOSIS — F41.9 ANXIETY: Primary | ICD-10-CM

## 2020-04-26 DIAGNOSIS — R07.9 CHEST PAIN: ICD-10-CM

## 2020-04-26 LAB
ALBUMIN SERPL BCP-MCNC: 4 G/DL (ref 3.5–5.2)
ALP SERPL-CCNC: 64 U/L (ref 55–135)
ALT SERPL W/O P-5'-P-CCNC: 22 U/L (ref 10–44)
ANION GAP SERPL CALC-SCNC: 6 MMOL/L (ref 8–16)
AST SERPL-CCNC: 19 U/L (ref 10–40)
BASOPHILS # BLD AUTO: 0.02 K/UL (ref 0–0.2)
BASOPHILS NFR BLD: 0.2 % (ref 0–1.9)
BILIRUB SERPL-MCNC: 0.8 MG/DL (ref 0.1–1)
BNP SERPL-MCNC: 45 PG/ML (ref 0–99)
BUN SERPL-MCNC: 17 MG/DL (ref 8–23)
CALCIUM SERPL-MCNC: 8.8 MG/DL (ref 8.7–10.5)
CHLORIDE SERPL-SCNC: 104 MMOL/L (ref 95–110)
CO2 SERPL-SCNC: 27 MMOL/L (ref 23–29)
CREAT SERPL-MCNC: 0.6 MG/DL (ref 0.5–1.4)
D DIMER PPP FEU-MCNC: <100 NG/ML (ref 100–400)
DIFFERENTIAL METHOD: NORMAL
EOSINOPHIL # BLD AUTO: 0.2 K/UL (ref 0–0.5)
EOSINOPHIL NFR BLD: 2.8 % (ref 0–8)
ERYTHROCYTE [DISTWIDTH] IN BLOOD BY AUTOMATED COUNT: 13.5 % (ref 11.5–14.5)
EST. GFR  (AFRICAN AMERICAN): >60 ML/MIN/1.73 M^2
EST. GFR  (NON AFRICAN AMERICAN): >60 ML/MIN/1.73 M^2
GLUCOSE SERPL-MCNC: 133 MG/DL (ref 70–110)
HCT VFR BLD AUTO: 39.2 % (ref 37–48.5)
HGB BLD-MCNC: 13.1 G/DL (ref 12–16)
IMM GRANULOCYTES # BLD AUTO: 0.02 K/UL (ref 0–0.04)
IMM GRANULOCYTES NFR BLD AUTO: 0.2 % (ref 0–0.5)
LYMPHOCYTES # BLD AUTO: 2.1 K/UL (ref 1–4.8)
LYMPHOCYTES NFR BLD: 25.4 % (ref 18–48)
MCH RBC QN AUTO: 29.8 PG (ref 27–31)
MCHC RBC AUTO-ENTMCNC: 33.4 G/DL (ref 32–36)
MCV RBC AUTO: 89 FL (ref 82–98)
MONOCYTES # BLD AUTO: 0.6 K/UL (ref 0.3–1)
MONOCYTES NFR BLD: 7.1 % (ref 4–15)
NEUTROPHILS # BLD AUTO: 5.3 K/UL (ref 1.8–7.7)
NEUTROPHILS NFR BLD: 64.3 % (ref 38–73)
NRBC BLD-RTO: 0 /100 WBC
PLATELET # BLD AUTO: 196 K/UL (ref 150–350)
PMV BLD AUTO: 10.9 FL (ref 9.2–12.9)
POTASSIUM SERPL-SCNC: 3.5 MMOL/L (ref 3.5–5.1)
PROT SERPL-MCNC: 6.4 G/DL (ref 6–8.4)
RBC # BLD AUTO: 4.4 M/UL (ref 4–5.4)
SARS-COV-2 RDRP RESP QL NAA+PROBE: NEGATIVE
SODIUM SERPL-SCNC: 137 MMOL/L (ref 136–145)
TROPONIN I SERPL DL<=0.01 NG/ML-MCNC: 0.02 NG/ML (ref 0.02–0.5)
WBC # BLD AUTO: 8.2 K/UL (ref 3.9–12.7)

## 2020-04-26 PROCEDURE — 83880 ASSAY OF NATRIURETIC PEPTIDE: CPT

## 2020-04-26 PROCEDURE — 84484 ASSAY OF TROPONIN QUANT: CPT

## 2020-04-26 PROCEDURE — 93005 ELECTROCARDIOGRAM TRACING: CPT

## 2020-04-26 PROCEDURE — U0002 COVID-19 LAB TEST NON-CDC: HCPCS

## 2020-04-26 PROCEDURE — 71045 X-RAY EXAM CHEST 1 VIEW: CPT | Mod: TC,FY

## 2020-04-26 PROCEDURE — 71045 X-RAY EXAM CHEST 1 VIEW: CPT | Mod: 26,,, | Performed by: RADIOLOGY

## 2020-04-26 PROCEDURE — 85379 FIBRIN DEGRADATION QUANT: CPT

## 2020-04-26 PROCEDURE — 85025 COMPLETE CBC W/AUTO DIFF WBC: CPT

## 2020-04-26 PROCEDURE — 71045 XR CHEST AP PORTABLE: ICD-10-PCS | Mod: 26,,, | Performed by: RADIOLOGY

## 2020-04-26 PROCEDURE — 99285 EMERGENCY DEPT VISIT HI MDM: CPT | Mod: 25

## 2020-04-26 PROCEDURE — 80053 COMPREHEN METABOLIC PANEL: CPT

## 2020-04-26 NOTE — ED TRIAGE NOTES
Patient complaining of chest pain, vertigo and weakness x2 weeks, was tested for covid 2 weeks ago and was negative.

## 2020-04-26 NOTE — ED PROVIDER NOTES
Encounter Date: 2020       History     Chief Complaint   Patient presents with    Chest Pain     Patient complaining of chest pain, vertigo and weakness x2 weeks, was tested for covid 2 weeks ago and was negative.    Weakness    Dizziness     60-year-old female presents complaining of chest heaviness for the past few days intermittent in nature she states she is worried about which she sees on TV concerning corona virus she has a history of depression in the past but has no apparent worsening of this she has not had a diagnosis of anxiety but has felt tense in hypervigilant has no auditory hallucinations and no suicidal ideation she currently takes Wellbutrin and has for 1 and half years        Review of patient's allergies indicates:  No Known Allergies  Past Medical History:   Diagnosis Date    Colon polyp     COPD (chronic obstructive pulmonary disease)     Depression     GERD (gastroesophageal reflux disease)     Hyperlipidemia     Hypertension      Past Surgical History:   Procedure Laterality Date    COLONOSCOPY      HYSTERECTOMY      TOTAL    INJECTION OF ANESTHETIC AGENT AROUND MEDIAL BRANCH NERVES INNERVATING LUMBAR FACET JOINT Right 2020    Procedure: Block-nerve-medial branch-lumbar;  Surgeon: Sergey Perez MD;  Location: [facility] OR;  Service: Pain Management;  Laterality: Right;  L3,4,5    RADIOFREQUENCY ABLATION OF LUMBAR MEDIAL BRANCH NERVE AT SINGLE LEVEL Right 2020    Procedure: Radiofrequency Ablation, Nerve, Spinal, Lumbar, Medial Branch, 1 Level;  Surgeon: Sergey Perez MD;  Location: [facility] OR;  Service: Pain Management;  Laterality: Right;  L3,4,5     Family History   Problem Relation Age of Onset    Heart disease Mother     Heart disease Father     Cancer Father     Heart disease Brother      Social History     Tobacco Use    Smoking status: Former Smoker     Types: Cigarettes     Last attempt to quit: 10/1/2017     Years since quittin.5    Smokeless tobacco: Never  Used   Substance Use Topics    Alcohol use: No    Drug use: Not Currently     Review of Systems   Constitutional: Negative for fever.   HENT: Negative for sore throat.    Respiratory: Negative for shortness of breath.    Cardiovascular: Negative for chest pain.   Gastrointestinal: Negative for nausea.   Genitourinary: Negative for dysuria.   Musculoskeletal: Negative for back pain.   Skin: Negative for rash.   Neurological: Negative for weakness.   Hematological: Does not bruise/bleed easily.   Psychiatric/Behavioral: Negative for agitation and hallucinations. The patient is not hyperactive.        Physical Exam     Initial Vitals [04/26/20 1517]   BP Pulse Resp Temp SpO2   (!) 151/77 88 20 97.9 °F (36.6 °C) 95 %      MAP       --         Physical Exam    Nursing note and vitals reviewed.  Constitutional: She appears well-developed and well-nourished. She is not diaphoretic. No distress.   HENT:   Head: Normocephalic and atraumatic.   Right Ear: External ear normal.   Left Ear: External ear normal.   Eyes: Pupils are equal, round, and reactive to light. Right eye exhibits no discharge. Left eye exhibits no discharge.   Neck: No tracheal deviation present. No JVD present.   Cardiovascular: Exam reveals no friction rub.    No murmur heard.  Pulmonary/Chest: No stridor. No respiratory distress. She has no wheezes. She has no rales.   Abdominal: Bowel sounds are normal. She exhibits no distension.   Musculoskeletal: Normal range of motion.   Neurological: She is alert.   Skin: Skin is warm.   Psychiatric: She has a normal mood and affect.   Patient is slightly anxious         ED Course   Procedures  Labs Reviewed   COMPREHENSIVE METABOLIC PANEL - Abnormal; Notable for the following components:       Result Value    Glucose 133 (*)     Anion Gap 6 (*)     All other components within normal limits   CBC W/ AUTO DIFFERENTIAL   B-TYPE NATRIURETIC PEPTIDE   D DIMER, QUANTITATIVE   TROPONIN I   SARS-COV-2 RNA AMPLIFICATION,  QUAL    Narrative:     What symptom criteria does the patient meet?->Cough  What symptom criteria does the patient meet?->Difficulty  breathing     EKG Readings: (Independently Interpreted)   Initial Reading: No STEMI. Rhythm: Normal Sinus Rhythm. Heart Rate: 80. Ectopy: No Ectopy. Conduction: Normal. ST Segments: Normal ST Segments. T Waves: Normal.       Imaging Results          X-Ray Chest AP Portable (Final result)  Result time 04/26/20 16:14:05    Final result by Beverly Wilkerson MD (04/26/20 16:14:05)                 Impression:      No acute cardiopulmonary disease.      Electronically signed by: Beverly Wilkerson MD  Date:    04/26/2020  Time:    16:14             Narrative:    EXAMINATION:  XR CHEST AP PORTABLE    CLINICAL HISTORY:  Suspected Covid-19 Virus Infection;    TECHNIQUE:  Single frontal view of the chest was performed.    COMPARISON:  None    FINDINGS:  The heart is not enlarged.  The lungs are clear of infiltrate.  No pleural effusion.                                                   ED Course as of Apr 26 1742   Sun Apr 26, 2020   1516 C/o intermittent CP, gen weakness & dizziness x2 weeks that is worsening today    I have performed the rapid medical exam (RME) portion of the medical screening exam (MSE) & have determined that this patient requires further evaluation and/or testing to determine if an emergent medical condition exists     [DH]      ED Course User Index  [DH] Anibal Mattson NP                Clinical Impression:       ICD-10-CM ICD-9-CM   1. Anxiety F41.9 300.00   2. Chest pain R07.9 786.50             ED Disposition Condition    Discharge Stable        ED Prescriptions     None        Follow-up Information    None                                    Arden Infante MD  04/26/20 1746       Arden Infante MD  05/07/20 0657

## 2020-05-05 ENCOUNTER — PATIENT MESSAGE (OUTPATIENT)
Dept: ADMINISTRATIVE | Facility: HOSPITAL | Age: 60
End: 2020-05-05

## 2020-05-06 ENCOUNTER — TELEPHONE (OUTPATIENT)
Dept: FAMILY MEDICINE | Facility: CLINIC | Age: 60
End: 2020-05-06

## 2020-05-06 NOTE — TELEPHONE ENCOUNTER
Patient called via phone. Pt states she will let us know when she will need to schedule an appt. Pt states she is disappointed that the Provider did not call her back and states her daughter advised her to find a new provider. Verbalized to pt her concerns and pt states she understands that with COVID 19 how somethings can get shuffled around.

## 2020-05-19 ENCOUNTER — PATIENT MESSAGE (OUTPATIENT)
Dept: FAMILY MEDICINE | Facility: CLINIC | Age: 60
End: 2020-05-19

## 2020-05-20 ENCOUNTER — PATIENT MESSAGE (OUTPATIENT)
Dept: ADMINISTRATIVE | Facility: HOSPITAL | Age: 60
End: 2020-05-20

## 2020-05-20 ENCOUNTER — TELEPHONE (OUTPATIENT)
Dept: FAMILY MEDICINE | Facility: CLINIC | Age: 60
End: 2020-05-20

## 2020-05-20 NOTE — TELEPHONE ENCOUNTER
"Sent original to scanning. Patient notified via portal      Faxed record request to [facility] Medical Records to get last mammogram results.   Confirmation received:  5/20/2020 9:03:28 AM Transmission Record  Sent to [id number] with remote ID "[email or web address]"  Result: (0/339;0/0) Success    "

## 2020-05-21 ENCOUNTER — PATIENT MESSAGE (OUTPATIENT)
Dept: PAIN MEDICINE | Facility: CLINIC | Age: 60
End: 2020-05-21

## 2020-05-27 RX ORDER — MAGNESIUM L-LACTATE 84 MG
TABLET, EXTENDED RELEASE ORAL
Qty: 90 TABLET | Refills: 3 | Status: SHIPPED | OUTPATIENT
Start: 2020-05-27 | End: 2023-03-08

## 2020-07-06 ENCOUNTER — PATIENT OUTREACH (OUTPATIENT)
Dept: ADMINISTRATIVE | Facility: HOSPITAL | Age: 60
End: 2020-07-06

## 2020-07-23 ENCOUNTER — OFFICE VISIT (OUTPATIENT)
Dept: FAMILY MEDICINE | Facility: CLINIC | Age: 60
End: 2020-07-23
Payer: COMMERCIAL

## 2020-07-23 VITALS
TEMPERATURE: 98 F | WEIGHT: 188.38 LBS | BODY MASS INDEX: 30.28 KG/M2 | HEIGHT: 66 IN | HEART RATE: 67 BPM | OXYGEN SATURATION: 95 % | SYSTOLIC BLOOD PRESSURE: 133 MMHG | RESPIRATION RATE: 16 BRPM | DIASTOLIC BLOOD PRESSURE: 88 MMHG

## 2020-07-23 DIAGNOSIS — K21.9 GASTROESOPHAGEAL REFLUX DISEASE, ESOPHAGITIS PRESENCE NOT SPECIFIED: Primary | ICD-10-CM

## 2020-07-23 DIAGNOSIS — I10 ESSENTIAL HYPERTENSION: ICD-10-CM

## 2020-07-23 DIAGNOSIS — L91.8 SKIN TAG: ICD-10-CM

## 2020-07-23 PROCEDURE — 3079F PR MOST RECENT DIASTOLIC BLOOD PRESSURE 80-89 MM HG: ICD-10-PCS | Mod: CPTII,S$GLB,, | Performed by: FAMILY MEDICINE

## 2020-07-23 PROCEDURE — 99214 PR OFFICE/OUTPT VISIT, EST, LEVL IV, 30-39 MIN: ICD-10-PCS | Mod: S$GLB,,, | Performed by: FAMILY MEDICINE

## 2020-07-23 PROCEDURE — 3079F DIAST BP 80-89 MM HG: CPT | Mod: CPTII,S$GLB,, | Performed by: FAMILY MEDICINE

## 2020-07-23 PROCEDURE — 3075F SYST BP GE 130 - 139MM HG: CPT | Mod: CPTII,S$GLB,, | Performed by: FAMILY MEDICINE

## 2020-07-23 PROCEDURE — 3008F BODY MASS INDEX DOCD: CPT | Mod: CPTII,S$GLB,, | Performed by: FAMILY MEDICINE

## 2020-07-23 PROCEDURE — 3075F PR MOST RECENT SYSTOLIC BLOOD PRESS GE 130-139MM HG: ICD-10-PCS | Mod: CPTII,S$GLB,, | Performed by: FAMILY MEDICINE

## 2020-07-23 PROCEDURE — 3008F PR BODY MASS INDEX (BMI) DOCUMENTED: ICD-10-PCS | Mod: CPTII,S$GLB,, | Performed by: FAMILY MEDICINE

## 2020-07-23 PROCEDURE — 99214 OFFICE O/P EST MOD 30 MIN: CPT | Mod: S$GLB,,, | Performed by: FAMILY MEDICINE

## 2020-07-23 RX ORDER — FAMOTIDINE 10 MG/1
10 TABLET ORAL 2 TIMES DAILY
COMMUNITY
End: 2020-07-29

## 2020-07-23 RX ORDER — PANTOPRAZOLE SODIUM 40 MG/1
40 TABLET, DELAYED RELEASE ORAL DAILY
Qty: 30 TABLET | Refills: 11 | Status: SHIPPED | OUTPATIENT
Start: 2020-07-23 | End: 2020-07-29

## 2020-07-23 NOTE — PROGRESS NOTES
[facility] - Clinic Note    Subjective      Ms. Degroot is a 60 y.o. female who presents to clinic for stomach issues.     Some days she does not have problem. She has problems when she eats. Toast does well. Ice cream does not hurt.   Hiatal hernia. Has not been taking prilosec. Has chronic severe reflux  Needs skin tag removal   Left arm lump/lipoma  htn - controlled        Review of Systems   Constitutional: Negative.    HENT: Negative.    Eyes: Negative.    Respiratory: Negative for shortness of breath.    Cardiovascular: Negative for chest pain.   Gastrointestinal: Positive for abdominal pain. Negative for nausea and vomiting.   Endocrine: Negative.    Genitourinary: Negative.    Musculoskeletal: Negative.    Skin: Negative.         Skin tag   Neurological: Negative.    Hematological: Negative.    Psychiatric/Behavioral: Negative.      ROS otherwise negative    [facility] Josseline has a past medical history of Colon polyp, COPD (chronic obstructive pulmonary disease), Depression, GERD (gastroesophageal reflux disease), Hyperlipidemia, and Hypertension.   PSX Josseline has a past surgical history that includes Hysterectomy; Colonoscopy; Injection of anesthetic agent around medial branch nerves innervating lumbar facet joint (Right, 2/4/2020); and Radiofrequency ablation of lumbar medial branch nerve at single level (Right, 2/12/2020).    Josseline's family history includes Cancer in her father; Heart disease in her brother, father, and mother.    Josseline reports that she quit smoking about 2 years ago. Her smoking use included cigarettes. She has never used smokeless tobacco. She reports previous drug use. She reports that she does not drink alcohol.   [facility] Josseline has No Known Allergies.   MED Josseline has a current medication list which includes the following prescription(s): ascorbic acid/zinc, bupropion, ergocalciferol, estradiol, lisinopril, magtab, omeprazole, rosuvastatin, sucralfate, umeclidinium-vilanterol, and  "ventolin hfa, and the following Facility-Administered Medications: sodium chloride 0.9% and lactated ringers.       Objective     /88 (BP Location: Left arm, Patient Position: Sitting)   Pulse 67   Temp 97.6 °F (36.4 °C)   Resp 16   Ht 5' 6" (1.676 m)   Wt 85.5 kg (188 lb 6.4 oz)   LMP  (LMP Unknown)   SpO2 95%   BMI 30.41 kg/m²     Physical Exam  Vitals signs reviewed.   Constitutional:       Appearance: Normal appearance. She is normal weight.   HENT:      Head: Normocephalic and atraumatic.      Right Ear: External ear normal.      Left Ear: External ear normal.      Nose: Nose normal.      Mouth/Throat:      Mouth: Mucous membranes are moist.   Eyes:      Conjunctiva/sclera: Conjunctivae normal.   Cardiovascular:      Rate and Rhythm: Normal rate and regular rhythm.      Pulses: Normal pulses.      Heart sounds: No murmur.   Pulmonary:      Effort: Pulmonary effort is normal. No respiratory distress.      Breath sounds: No wheezing or rales.   Neurological:      Mental Status: She is alert.        Assessment/Plan     1. Gastroesophageal reflux disease, esophagitis presence not specified     2. Skin tag     3. Essential hypertension         Follow up in 6 months for regular visits  Needs mammogram scheduled  Restart Prilosec. Continue carafate. Continue Pepcid. For the next 12 weeks. Needs EGD. Follow up with GI.  Needs skin tag removed.         Future Appointments   Date Time Provider Department Center   9/17/2020  8:20 AM [id number]       Lori Cool MD  Family Medicine  [facility]  [phone]      "

## 2020-07-29 ENCOUNTER — OFFICE VISIT (OUTPATIENT)
Dept: FAMILY MEDICINE | Facility: CLINIC | Age: 60
End: 2020-07-29
Payer: COMMERCIAL

## 2020-07-29 VITALS
WEIGHT: 185.19 LBS | HEIGHT: 66 IN | SYSTOLIC BLOOD PRESSURE: 119 MMHG | DIASTOLIC BLOOD PRESSURE: 77 MMHG | BODY MASS INDEX: 29.76 KG/M2 | OXYGEN SATURATION: 95 % | RESPIRATION RATE: 15 BRPM | HEART RATE: 76 BPM | TEMPERATURE: 97 F

## 2020-07-29 DIAGNOSIS — L98.9 SKIN LESION: Primary | ICD-10-CM

## 2020-07-29 PROCEDURE — 99499 UNLISTED E&M SERVICE: CPT | Mod: S$GLB,,, | Performed by: FAMILY MEDICINE

## 2020-07-29 PROCEDURE — 11200 PR REMOVAL OF SKIN TAGS, UP TO 15: ICD-10-PCS | Mod: S$GLB,,, | Performed by: FAMILY MEDICINE

## 2020-07-29 PROCEDURE — 88305 TISSUE EXAM BY PATHOLOGIST: CPT | Performed by: PATHOLOGY

## 2020-07-29 PROCEDURE — 11200 RMVL SKIN TAGS UP TO&INC 15: CPT | Mod: S$GLB,,, | Performed by: FAMILY MEDICINE

## 2020-07-29 PROCEDURE — 99499 NO LOS: ICD-10-PCS | Mod: S$GLB,,, | Performed by: FAMILY MEDICINE

## 2020-07-29 PROCEDURE — 88305 TISSUE EXAM BY PATHOLOGIST: CPT | Mod: 26,,, | Performed by: PATHOLOGY

## 2020-07-29 PROCEDURE — 88305 TISSUE EXAM BY PATHOLOGIST: ICD-10-PCS | Mod: 26,,, | Performed by: PATHOLOGY

## 2020-07-29 NOTE — PROGRESS NOTES
"Excision of Lesion - Irritated Skin Lesion    Date/Time: 7/29/2020 7:40 AM  Performed by: Marvin Vega MD  Authorized by: Marvin Vega MD     Consent Done?:  Yes (Written)  Time out: Immediately prior to procedure a "time out" was called to verify the correct patient, procedure, equipment, support staff and site/side marked as required.    INDICATIONS:: Irritated skin lesion - likely skin tags.  LOCATION:  Location: [place].(Right)    PREP:  Patient was prepped and draped in the normal sterile fashion.  Position:  Supine    ANESTHESIA:  Anesthesia:  Local infiltration  Local anesthetic:  Lidocaine 1% without epinephrine  Anesthetic total: 1mL    PROCEDURE DETAILS:  Excision type:  Skin  Incision type: Two 2 mm pigmented skin lesion. Lesions were grasped with foreceps and removed using a single straight incision with an 11 blade.  Hemostasis was obtained using silver nitrate.  Specimens?: Yes    Specimens submitted to pathology.  Hemostasis was obtained.  Patient tolerated the procedure well with no immediate complications.  Post-operative instructions were provided for the patient.          "

## 2020-07-31 LAB
FINAL PATHOLOGIC DIAGNOSIS: NORMAL
GROSS: NORMAL

## 2020-08-21 ENCOUNTER — TELEPHONE (OUTPATIENT)
Dept: FAMILY MEDICINE | Facility: CLINIC | Age: 60
End: 2020-08-21

## 2020-08-21 ENCOUNTER — PATIENT OUTREACH (OUTPATIENT)
Dept: ADMINISTRATIVE | Facility: OTHER | Age: 60
End: 2020-08-21

## 2020-08-21 DIAGNOSIS — L98.9 SKIN LESION: Primary | ICD-10-CM

## 2020-08-21 NOTE — PROGRESS NOTES
Health Maintenance Due   Topic Date Due    HIV Screening  03/07/1975    TETANUS VACCINE  03/07/1976    Shingles Vaccine (1 of 2) 03/07/2008     Updates were requested from care everywhere.  Chart was reviewed for overdue Proactive [facility] ([facility]) topics (CRS, Breast Cancer Screening, Eye exam)  Health Maintenance has been updated.  LINKS immunization registry triggered.  Immunizations were reconciled.

## 2020-08-21 NOTE — TELEPHONE ENCOUNTER
Inform pt her request has been sent to her provider.          ----- Message from Ivonne Rush sent at 8/21/2020 11:25 AM CDT -----  Contact: pt  Pt states that she is returning Nurse Marylou phone call that was made today. Can be reached at.[phone] (home)

## 2020-08-23 ENCOUNTER — TELEPHONE (OUTPATIENT)
Dept: GASTROENTEROLOGY | Facility: CLINIC | Age: 60
End: 2020-08-23

## 2020-08-24 ENCOUNTER — LAB VISIT (OUTPATIENT)
Dept: LAB | Facility: HOSPITAL | Age: 60
End: 2020-08-24
Attending: INTERNAL MEDICINE
Payer: COMMERCIAL

## 2020-08-24 ENCOUNTER — OFFICE VISIT (OUTPATIENT)
Dept: GASTROENTEROLOGY | Facility: CLINIC | Age: 60
End: 2020-08-24
Payer: COMMERCIAL

## 2020-08-24 VITALS
BODY MASS INDEX: 29.73 KG/M2 | HEART RATE: 70 BPM | TEMPERATURE: 98 F | SYSTOLIC BLOOD PRESSURE: 113 MMHG | DIASTOLIC BLOOD PRESSURE: 73 MMHG | HEIGHT: 66 IN | OXYGEN SATURATION: 95 % | RESPIRATION RATE: 18 BRPM | WEIGHT: 185 LBS

## 2020-08-24 DIAGNOSIS — Z12.11 COLON CANCER SCREENING: ICD-10-CM

## 2020-08-24 DIAGNOSIS — Z80.0 FAMILY HX OF COLON CANCER: ICD-10-CM

## 2020-08-24 DIAGNOSIS — R19.7 DIARRHEA, UNSPECIFIED TYPE: ICD-10-CM

## 2020-08-24 DIAGNOSIS — Z01.818 PREOPERATIVE TESTING: ICD-10-CM

## 2020-08-24 DIAGNOSIS — Z80.0 FAMILY HISTORY OF COLON CANCER: ICD-10-CM

## 2020-08-24 DIAGNOSIS — K44.9 HIATAL HERNIA: ICD-10-CM

## 2020-08-24 DIAGNOSIS — K21.9 GERD (GASTROESOPHAGEAL REFLUX DISEASE): ICD-10-CM

## 2020-08-24 DIAGNOSIS — Z86.010 HISTORY OF COLON POLYPS: ICD-10-CM

## 2020-08-24 DIAGNOSIS — K21.9 GASTROESOPHAGEAL REFLUX DISEASE, ESOPHAGITIS PRESENCE NOT SPECIFIED: Primary | ICD-10-CM

## 2020-08-24 DIAGNOSIS — K21.9 GASTROESOPHAGEAL REFLUX DISEASE, ESOPHAGITIS PRESENCE NOT SPECIFIED: Primary | Chronic | ICD-10-CM

## 2020-08-24 LAB
ANION GAP SERPL CALC-SCNC: 10 MMOL/L (ref 8–16)
BASOPHILS # BLD AUTO: 0.02 K/UL (ref 0–0.2)
BASOPHILS NFR BLD: 0.2 % (ref 0–1.9)
BUN SERPL-MCNC: 20 MG/DL (ref 8–23)
CALCIUM SERPL-MCNC: 9.3 MG/DL (ref 8.7–10.5)
CHLORIDE SERPL-SCNC: 103 MMOL/L (ref 95–110)
CO2 SERPL-SCNC: 25 MMOL/L (ref 23–29)
CREAT SERPL-MCNC: 0.7 MG/DL (ref 0.5–1.4)
DIFFERENTIAL METHOD: NORMAL
EOSINOPHIL # BLD AUTO: 0.2 K/UL (ref 0–0.5)
EOSINOPHIL NFR BLD: 2.1 % (ref 0–8)
ERYTHROCYTE [DISTWIDTH] IN BLOOD BY AUTOMATED COUNT: 13 % (ref 11.5–14.5)
EST. GFR  (AFRICAN AMERICAN): >60 ML/MIN/1.73 M^2
EST. GFR  (NON AFRICAN AMERICAN): >60 ML/MIN/1.73 M^2
GLUCOSE SERPL-MCNC: 95 MG/DL (ref 70–110)
HCT VFR BLD AUTO: 42 % (ref 37–48.5)
HGB BLD-MCNC: 13.9 G/DL (ref 12–16)
IMM GRANULOCYTES # BLD AUTO: 0.04 K/UL (ref 0–0.04)
IMM GRANULOCYTES NFR BLD AUTO: 0.4 % (ref 0–0.5)
LYMPHOCYTES # BLD AUTO: 1.9 K/UL (ref 1–4.8)
LYMPHOCYTES NFR BLD: 19.4 % (ref 18–48)
MCH RBC QN AUTO: 29.8 PG (ref 27–31)
MCHC RBC AUTO-ENTMCNC: 33.1 G/DL (ref 32–36)
MCV RBC AUTO: 90 FL (ref 82–98)
MONOCYTES # BLD AUTO: 0.7 K/UL (ref 0.3–1)
MONOCYTES NFR BLD: 7.3 % (ref 4–15)
NEUTROPHILS # BLD AUTO: 6.8 K/UL (ref 1.8–7.7)
NEUTROPHILS NFR BLD: 70.6 % (ref 38–73)
NRBC BLD-RTO: 0 /100 WBC
PLATELET # BLD AUTO: 209 K/UL (ref 150–350)
PMV BLD AUTO: 11.8 FL (ref 9.2–12.9)
POTASSIUM SERPL-SCNC: 4.1 MMOL/L (ref 3.5–5.1)
RBC # BLD AUTO: 4.67 M/UL (ref 4–5.4)
SODIUM SERPL-SCNC: 138 MMOL/L (ref 136–145)
WBC # BLD AUTO: 9.56 K/UL (ref 3.9–12.7)

## 2020-08-24 PROCEDURE — 3008F BODY MASS INDEX DOCD: CPT | Mod: CPTII,S$GLB,, | Performed by: INTERNAL MEDICINE

## 2020-08-24 PROCEDURE — 3078F DIAST BP <80 MM HG: CPT | Mod: CPTII,S$GLB,, | Performed by: INTERNAL MEDICINE

## 2020-08-24 PROCEDURE — 99214 PR OFFICE/OUTPT VISIT, EST, LEVL IV, 30-39 MIN: ICD-10-PCS | Mod: S$GLB,,, | Performed by: INTERNAL MEDICINE

## 2020-08-24 PROCEDURE — 36415 COLL VENOUS BLD VENIPUNCTURE: CPT

## 2020-08-24 PROCEDURE — 99214 OFFICE O/P EST MOD 30 MIN: CPT | Mod: S$GLB,,, | Performed by: INTERNAL MEDICINE

## 2020-08-24 PROCEDURE — 3008F PR BODY MASS INDEX (BMI) DOCUMENTED: ICD-10-PCS | Mod: CPTII,S$GLB,, | Performed by: INTERNAL MEDICINE

## 2020-08-24 PROCEDURE — 80048 BASIC METABOLIC PNL TOTAL CA: CPT

## 2020-08-24 PROCEDURE — 3078F PR MOST RECENT DIASTOLIC BLOOD PRESSURE < 80 MM HG: ICD-10-PCS | Mod: CPTII,S$GLB,, | Performed by: INTERNAL MEDICINE

## 2020-08-24 PROCEDURE — 85025 COMPLETE CBC W/AUTO DIFF WBC: CPT

## 2020-08-24 PROCEDURE — 99999 PR PBB SHADOW E&M-EST. PATIENT-LVL IV: ICD-10-PCS | Mod: PBBFAC,,, | Performed by: INTERNAL MEDICINE

## 2020-08-24 PROCEDURE — 3074F SYST BP LT 130 MM HG: CPT | Mod: CPTII,S$GLB,, | Performed by: INTERNAL MEDICINE

## 2020-08-24 PROCEDURE — 3074F PR MOST RECENT SYSTOLIC BLOOD PRESSURE < 130 MM HG: ICD-10-PCS | Mod: CPTII,S$GLB,, | Performed by: INTERNAL MEDICINE

## 2020-08-24 PROCEDURE — 99999 PR PBB SHADOW E&M-EST. PATIENT-LVL IV: CPT | Mod: PBBFAC,,, | Performed by: INTERNAL MEDICINE

## 2020-08-24 RX ORDER — SODIUM, POTASSIUM,MAG SULFATES 17.5-3.13G
1 SOLUTION, RECONSTITUTED, ORAL ORAL DAILY
Qty: 1 KIT | Refills: 0 | Status: SHIPPED | OUTPATIENT
Start: 2020-08-24 | End: 2020-08-26

## 2020-08-24 NOTE — PROGRESS NOTES
Subjective:       Patient ID: Josseline Degroot is a 60 y.o. female.    Chief Complaint: Follow-up    The upper GI x-rays revealed a hiatal hernia with problem gastroesophageal reflux.  She is on the pantoprazole but still complains of pyrosis with occasional nausea without vomiting.  She denies a precipitating factor.  She denies dysphagia aspiration.  She generally has daily bowel movements that her semi-solid occasional with urgency 4-5 times per day.  They are not nocturnal..  She used to have a colonoscopy every 5 years because her father had colon cancer in his 60s.  Six years ago she had a colonoscopy I woke up during the colonoscopy .  He has severe pain and she has been afraid to have further GI evaluation since then.  She  denies hematemesis hematochezia jaundice or bleeding.      Allergies:  Review of patient's allergies indicates:  No Known Allergies    Medications:    Current Outpatient Medications:     ascorbic acid/zinc (ZINC WITH VITAMIN C ORAL), Take by mouth., Disp: , Rfl:     buPROPion (WELLBUTRIN XL) 150 MG TB24 tablet, Take 150 mg by mouth once daily., Disp: , Rfl:     ergocalciferol (ERGOCALCIFEROL) 50,000 unit Cap, Take 1 capsule (50,000 Units total) by mouth every 7 days., Disp: 4 capsule, Rfl: 2    estradiol (ESTRACE) 0.5 MG tablet, Take 1 tablet (0.5 mg total) by mouth once daily., Disp: 90 tablet, Rfl: 3    lisinopriL (PRINIVIL,ZESTRIL) 20 MG tablet, Take 1 tablet (20 mg total) by mouth once daily., Disp: 90 tablet, Rfl: 3    MAGTAB 84 mg TbSR, TAKE 1 TABLET ONCE DAILY, Disp: 90 tablet, Rfl: 3    omeprazole (PRILOSEC) 40 MG capsule, Take 1 capsule by mouth daily as needed., Disp: , Rfl:     rosuvastatin (CRESTOR) 10 MG tablet, Take 1 tablet (10 mg total) by mouth once daily., Disp: 90 tablet, Rfl: 3    sucralfate (CARAFATE) 1 gram tablet, Take 1 tablet (1 g total) by mouth 4 (four) times daily before meals and nightly., Disp: 360 tablet, Rfl: 3    umeclidinium-vilanterol (ANORO  ELLIPTA) 62.5-25 mcg/actuation DsDv, Inhale 1 puff into the lungs once daily., Disp: 3 each, Rfl: 3    VENTOLIN HFA 90 mcg/actuation inhaler, Inhale 2 puffs into the lungs every 4 (four) hours as needed., Disp: , Rfl: 0    sodium,potassium,mag sulfates (SUPREP BOWEL PREP KIT) 17.5-3.13-1.6 gram SolR, Take 177 mLs by mouth once daily. for 2 days, Disp: 1 kit, Rfl: 0  No current facility-administered medications for this visit.     Facility-Administered Medications Ordered in Other Visits:     0.9%  NaCl infusion, , Intravenous, Continuous, Sergey Perez MD, Stopped at 02/04/20 1110    lactated ringers infusion, , Intravenous, Once PRN, Sergey Perez MD    Past Medical History:   Diagnosis Date    Colon polyp     COPD (chronic obstructive pulmonary disease)     Depression     GERD (gastroesophageal reflux disease)     Hyperlipidemia     Hypertension        Past Surgical History:   Procedure Laterality Date    COLONOSCOPY      HYSTERECTOMY      TOTAL    INJECTION OF ANESTHETIC AGENT AROUND MEDIAL BRANCH NERVES INNERVATING LUMBAR FACET JOINT Right 2/4/2020    Procedure: Block-nerve-medial branch-lumbar;  Surgeon: Sergey Perez MD;  Location: [facility] OR;  Service: Pain Management;  Laterality: Right;  L3,4,5    RADIOFREQUENCY ABLATION OF LUMBAR MEDIAL BRANCH NERVE AT SINGLE LEVEL Right 2/12/2020    Procedure: Radiofrequency Ablation, Nerve, Spinal, Lumbar, Medial Branch, 1 Level;  Surgeon: Sergey Perez MD;  Location: [facility] OR;  Service: Pain Management;  Laterality: Right;  L3,4,5         Review of Systems   Constitutional: Negative for appetite change, fever and unexpected weight change.   HENT: Negative for trouble swallowing.         No jaundice.   Respiratory: Negative for cough, shortness of breath and wheezing.         She is a former cigarette smoker.  She denies dysphagia aspiration or hemoptysis.  She has occasional coughing and sputum production has pulmonary inhalers.  She denies a precipitating factor.   She states she is not using tobacco alcohol at this time.  She denies dyspnea on exertion.   Cardiovascular: Negative for chest pain.        She denies exertional chest pain or rhythm disturbance.   Gastrointestinal: Positive for abdominal pain and nausea. Negative for abdominal distention, anal bleeding, blood in stool, constipation, diarrhea and rectal pain.        She is on the Protonix but still is having symptoms of regurgitation and pyrosis but she denies aspiration fever chills.   Musculoskeletal: Positive for arthralgias and back pain. Negative for neck pain.   Skin: Negative for pallor and rash.   Neurological: Negative for dizziness, seizures, syncope, speech difficulty, weakness and numbness.   Hematological: Negative for adenopathy.   Psychiatric/Behavioral: Negative for confusion.       Objective:      Physical Exam  Vitals signs reviewed.   Constitutional:       Appearance: She is well-developed.      Comments: Well-nourished well-hydrated afebrile nonicteric white female.  She is normocephalic.  Pupils are normal.  She is oriented x3.  She can relate her history and answer questions appropriately.  She is sitting comfortably in the chair.   HENT:      Head: Normocephalic.   Eyes:      Pupils: Pupils are equal, round, and reactive to light.   Neck:      Musculoskeletal: Normal range of motion and neck supple.      Thyroid: No thyromegaly.      Trachea: No tracheal deviation.   Cardiovascular:      Rate and Rhythm: Normal rate and regular rhythm.      Heart sounds: Normal heart sounds.   Pulmonary:      Effort: Pulmonary effort is normal.      Breath sounds: Normal breath sounds.   Abdominal:      General: Bowel sounds are normal. There is no distension.      Palpations: Abdomen is soft. There is no mass.      Tenderness: There is abdominal tenderness. There is no right CVA tenderness, left CVA tenderness, guarding or rebound.      Hernia: No hernia is present.      Comments: The abdomen is soft mildly  obese with mild tenderness in the epigastrium and left upper quadrant.  Organomegaly masses are not detected.  Bowel sounds normal.   Musculoskeletal: Normal range of motion.      Comments: She can ambulate normally.  She can go from the sitting the standing position without difficulty.   Lymphadenopathy:      Cervical: No cervical adenopathy.   Skin:     General: Skin is warm and dry.   Neurological:      Mental Status: She is alert and oriented to person, place, and time.      Cranial Nerves: No cranial nerve deficit.   Psychiatric:         Behavior: Behavior normal.           Plan:       Gastroesophageal reflux disease, esophagitis presence not specified    Diarrhea, unspecified type    History of colon polyps    Family history of colon cancer    Other orders  -     sodium,potassium,mag sulfates (SUPREP BOWEL PREP KIT) 17.5-3.13-1.6 gram SolR; Take 177 mLs by mouth once daily. for 2 days  Dispense: 1 kit; Refill: 0     She will continue her reflux regimen.  She continues her current medications diet and activities.  She will make a food diary and avoid the offending foods.  She will be scheduled for upper endoscopy and colonoscopy.  She has good health knowledge.  She understands the procedures.  Specifically she realizes there is an extremely small incidence of bleeding perforation or aspiration.

## 2020-08-24 NOTE — H&P (VIEW-ONLY)
Subjective:       Patient ID: Josseline Degroot is a 60 y.o. female.    Chief Complaint: Follow-up    The upper GI x-rays revealed a hiatal hernia with problem gastroesophageal reflux.  She is on the pantoprazole but still complains of pyrosis with occasional nausea without vomiting.  She denies a precipitating factor.  She denies dysphagia aspiration.  She generally has daily bowel movements that her semi-solid occasional with urgency 4-5 times per day.  They are not nocturnal..  She used to have a colonoscopy every 5 years because her father had colon cancer in his 60s.  Six years ago she had a colonoscopy I woke up during the colonoscopy .  He has severe pain and she has been afraid to have further GI evaluation since then.  She  denies hematemesis hematochezia jaundice or bleeding.      Allergies:  Review of patient's allergies indicates:  No Known Allergies    Medications:    Current Outpatient Medications:     ascorbic acid/zinc (ZINC WITH VITAMIN C ORAL), Take by mouth., Disp: , Rfl:     buPROPion (WELLBUTRIN XL) 150 MG TB24 tablet, Take 150 mg by mouth once daily., Disp: , Rfl:     ergocalciferol (ERGOCALCIFEROL) 50,000 unit Cap, Take 1 capsule (50,000 Units total) by mouth every 7 days., Disp: 4 capsule, Rfl: 2    estradiol (ESTRACE) 0.5 MG tablet, Take 1 tablet (0.5 mg total) by mouth once daily., Disp: 90 tablet, Rfl: 3    lisinopriL (PRINIVIL,ZESTRIL) 20 MG tablet, Take 1 tablet (20 mg total) by mouth once daily., Disp: 90 tablet, Rfl: 3    MAGTAB 84 mg TbSR, TAKE 1 TABLET ONCE DAILY, Disp: 90 tablet, Rfl: 3    omeprazole (PRILOSEC) 40 MG capsule, Take 1 capsule by mouth daily as needed., Disp: , Rfl:     rosuvastatin (CRESTOR) 10 MG tablet, Take 1 tablet (10 mg total) by mouth once daily., Disp: 90 tablet, Rfl: 3    sucralfate (CARAFATE) 1 gram tablet, Take 1 tablet (1 g total) by mouth 4 (four) times daily before meals and nightly., Disp: 360 tablet, Rfl: 3    umeclidinium-vilanterol (ANORO  ELLIPTA) 62.5-25 mcg/actuation DsDv, Inhale 1 puff into the lungs once daily., Disp: 3 each, Rfl: 3    VENTOLIN HFA 90 mcg/actuation inhaler, Inhale 2 puffs into the lungs every 4 (four) hours as needed., Disp: , Rfl: 0    sodium,potassium,mag sulfates (SUPREP BOWEL PREP KIT) 17.5-3.13-1.6 gram SolR, Take 177 mLs by mouth once daily. for 2 days, Disp: 1 kit, Rfl: 0  No current facility-administered medications for this visit.     Facility-Administered Medications Ordered in Other Visits:     0.9%  NaCl infusion, , Intravenous, Continuous, Sergey Perez MD, Stopped at 02/04/20 1110    lactated ringers infusion, , Intravenous, Once PRN, Sergey Perez MD    Past Medical History:   Diagnosis Date    Colon polyp     COPD (chronic obstructive pulmonary disease)     Depression     GERD (gastroesophageal reflux disease)     Hyperlipidemia     Hypertension        Past Surgical History:   Procedure Laterality Date    COLONOSCOPY      HYSTERECTOMY      TOTAL    INJECTION OF ANESTHETIC AGENT AROUND MEDIAL BRANCH NERVES INNERVATING LUMBAR FACET JOINT Right 2/4/2020    Procedure: Block-nerve-medial branch-lumbar;  Surgeon: Sergey Perez MD;  Location: [facility] OR;  Service: Pain Management;  Laterality: Right;  L3,4,5    RADIOFREQUENCY ABLATION OF LUMBAR MEDIAL BRANCH NERVE AT SINGLE LEVEL Right 2/12/2020    Procedure: Radiofrequency Ablation, Nerve, Spinal, Lumbar, Medial Branch, 1 Level;  Surgeon: Sergey Perez MD;  Location: [facility] OR;  Service: Pain Management;  Laterality: Right;  L3,4,5         Review of Systems   Constitutional: Negative for appetite change, fever and unexpected weight change.   HENT: Negative for trouble swallowing.         No jaundice.   Respiratory: Negative for cough, shortness of breath and wheezing.         She is a former cigarette smoker.  She denies dysphagia aspiration or hemoptysis.  She has occasional coughing and sputum production has pulmonary inhalers.  She denies a precipitating factor.   She states she is not using tobacco alcohol at this time.  She denies dyspnea on exertion.   Cardiovascular: Negative for chest pain.        She denies exertional chest pain or rhythm disturbance.   Gastrointestinal: Positive for abdominal pain and nausea. Negative for abdominal distention, anal bleeding, blood in stool, constipation, diarrhea and rectal pain.        She is on the Protonix but still is having symptoms of regurgitation and pyrosis but she denies aspiration fever chills.   Musculoskeletal: Positive for arthralgias and back pain. Negative for neck pain.   Skin: Negative for pallor and rash.   Neurological: Negative for dizziness, seizures, syncope, speech difficulty, weakness and numbness.   Hematological: Negative for adenopathy.   Psychiatric/Behavioral: Negative for confusion.       Objective:      Physical Exam  Vitals signs reviewed.   Constitutional:       Appearance: She is well-developed.      Comments: Well-nourished well-hydrated afebrile nonicteric white female.  She is normocephalic.  Pupils are normal.  She is oriented x3.  She can relate her history and answer questions appropriately.  She is sitting comfortably in the chair.   HENT:      Head: Normocephalic.   Eyes:      Pupils: Pupils are equal, round, and reactive to light.   Neck:      Musculoskeletal: Normal range of motion and neck supple.      Thyroid: No thyromegaly.      Trachea: No tracheal deviation.   Cardiovascular:      Rate and Rhythm: Normal rate and regular rhythm.      Heart sounds: Normal heart sounds.   Pulmonary:      Effort: Pulmonary effort is normal.      Breath sounds: Normal breath sounds.   Abdominal:      General: Bowel sounds are normal. There is no distension.      Palpations: Abdomen is soft. There is no mass.      Tenderness: There is abdominal tenderness. There is no right CVA tenderness, left CVA tenderness, guarding or rebound.      Hernia: No hernia is present.      Comments: The abdomen is soft mildly  obese with mild tenderness in the epigastrium and left upper quadrant.  Organomegaly masses are not detected.  Bowel sounds normal.   Musculoskeletal: Normal range of motion.      Comments: She can ambulate normally.  She can go from the sitting the standing position without difficulty.   Lymphadenopathy:      Cervical: No cervical adenopathy.   Skin:     General: Skin is warm and dry.   Neurological:      Mental Status: She is alert and oriented to person, place, and time.      Cranial Nerves: No cranial nerve deficit.   Psychiatric:         Behavior: Behavior normal.           Plan:       Gastroesophageal reflux disease, esophagitis presence not specified    Diarrhea, unspecified type    History of colon polyps    Family history of colon cancer    Other orders  -     sodium,potassium,mag sulfates (SUPREP BOWEL PREP KIT) 17.5-3.13-1.6 gram SolR; Take 177 mLs by mouth once daily. for 2 days  Dispense: 1 kit; Refill: 0     She will continue her reflux regimen.  She continues her current medications diet and activities.  She will make a food diary and avoid the offending foods.  She will be scheduled for upper endoscopy and colonoscopy.  She has good health knowledge.  She understands the procedures.  Specifically she realizes there is an extremely small incidence of bleeding perforation or aspiration.

## 2020-08-24 NOTE — PATIENT INSTRUCTIONS
She has a family history of colon cancer and in addition the upper GI series showed the hiatal hernia and she will be scheduled for upper endoscopy and colonoscopy.  She will make a food and avoid the offending foods she can continue the pantoprazole.  She continues her reflux regimen medications

## 2020-08-24 NOTE — LETTER
August 24, 2020      Lori Cool MD  [address] [place] [address]           [facility]  [address]  [place] [place] [address]  Phone: [phone]  Fax: [phone]          Patient: Josseline Degroot   MR Number: [id number]   YOB: 1960   Date of Visit: 8/24/2020       Dear Dr. Lori Cool:    Thank you for referring Josseline Degroot to me for evaluation. Attached you will find relevant portions of my assessment and plan of care.    If you have questions, please do not hesitate to call me. I look forward to following Josseline Degroot along with you.    Sincerely,    Hao Roger MD    Enclosure  CC:  No Recipients    If you would like to receive this communication electronically, please contact [email or web address] or [phone] to request more information on [facility] [employer] access.    For providers and/or their staff who would like to refer a patient to [facility], please contact us through our one-stop-shop provider referral line, [facility], at [phone].    If you feel you have received this communication in error or would no longer like to receive these types of communications, please e-mail [email or web address]

## 2020-09-17 ENCOUNTER — HOSPITAL ENCOUNTER (OUTPATIENT)
Dept: RADIOLOGY | Facility: HOSPITAL | Age: 60
Discharge: HOME | End: 2020-09-17
Attending: FAMILY MEDICINE
Payer: COMMERCIAL

## 2020-09-17 VITALS — BODY MASS INDEX: 29.72 KG/M2 | HEIGHT: 66 IN | WEIGHT: 184.94 LBS

## 2020-09-17 DIAGNOSIS — Z12.31 ENCOUNTER FOR SCREENING MAMMOGRAM FOR MALIGNANT NEOPLASM OF BREAST: ICD-10-CM

## 2020-09-17 DIAGNOSIS — Z12.39 BREAST CANCER SCREENING: ICD-10-CM

## 2020-09-17 PROCEDURE — 77063 MAMMO DIGITAL SCREENING BILAT WITH TOMO: ICD-10-PCS | Mod: 26,,, | Performed by: RADIOLOGY

## 2020-09-17 PROCEDURE — 77063 BREAST TOMOSYNTHESIS BI: CPT | Mod: 26,,, | Performed by: RADIOLOGY

## 2020-09-17 PROCEDURE — 77067 MAMMO DIGITAL SCREENING BILAT WITH TOMO: ICD-10-PCS | Mod: 26,,, | Performed by: RADIOLOGY

## 2020-09-17 PROCEDURE — 77067 SCR MAMMO BI INCL CAD: CPT | Mod: 26,,, | Performed by: RADIOLOGY

## 2020-09-17 PROCEDURE — 77067 SCR MAMMO BI INCL CAD: CPT | Mod: TC

## 2020-09-19 ENCOUNTER — LAB VISIT (OUTPATIENT)
Dept: FAMILY MEDICINE | Facility: CLINIC | Age: 60
End: 2020-09-19
Payer: COMMERCIAL

## 2020-09-19 DIAGNOSIS — Z01.818 PREOPERATIVE TESTING: ICD-10-CM

## 2020-09-19 PROCEDURE — U0003 INFECTIOUS AGENT DETECTION BY NUCLEIC ACID (DNA OR RNA); SEVERE ACUTE RESPIRATORY SYNDROME CORONAVIRUS 2 (SARS-COV-2) (CORONAVIRUS DISEASE [COVID-19]), AMPLIFIED PROBE TECHNIQUE, MAKING USE OF HIGH THROUGHPUT TECHNOLOGIES AS DESCRIBED BY CMS-2020-01-R: HCPCS

## 2020-09-20 ENCOUNTER — PATIENT MESSAGE (OUTPATIENT)
Dept: SURGERY | Facility: HOSPITAL | Age: 60
End: 2020-09-20

## 2020-09-20 LAB — SARS-COV-2 RNA RESP QL NAA+PROBE: NOT DETECTED

## 2020-09-23 ENCOUNTER — ANESTHESIA (OUTPATIENT)
Dept: SURGERY | Facility: HOSPITAL | Age: 60
End: 2020-09-23
Payer: COMMERCIAL

## 2020-09-23 ENCOUNTER — HOSPITAL ENCOUNTER (OUTPATIENT)
Facility: HOSPITAL | Age: 60
Discharge: HOME | End: 2020-09-23
Attending: INTERNAL MEDICINE | Admitting: INTERNAL MEDICINE
Payer: COMMERCIAL

## 2020-09-23 ENCOUNTER — ANESTHESIA EVENT (OUTPATIENT)
Dept: SURGERY | Facility: HOSPITAL | Age: 60
End: 2020-09-23
Payer: COMMERCIAL

## 2020-09-23 VITALS
WEIGHT: 185 LBS | OXYGEN SATURATION: 99 % | HEIGHT: 66 IN | SYSTOLIC BLOOD PRESSURE: 125 MMHG | DIASTOLIC BLOOD PRESSURE: 43 MMHG | TEMPERATURE: 98 F | BODY MASS INDEX: 29.73 KG/M2 | HEART RATE: 73 BPM | RESPIRATION RATE: 11 BRPM

## 2020-09-23 DIAGNOSIS — K21.9 GERD (GASTROESOPHAGEAL REFLUX DISEASE): ICD-10-CM

## 2020-09-23 DIAGNOSIS — Z80.0 FAMILY HX OF COLON CANCER: ICD-10-CM

## 2020-09-23 DIAGNOSIS — K21.9 GASTROESOPHAGEAL REFLUX DISEASE, ESOPHAGITIS PRESENCE NOT SPECIFIED: ICD-10-CM

## 2020-09-23 DIAGNOSIS — Z12.11 COLON CANCER SCREENING: ICD-10-CM

## 2020-09-23 LAB — SARS-COV-2 RDRP RESP QL NAA+PROBE: NEGATIVE

## 2020-09-23 PROCEDURE — U0002 COVID-19 LAB TEST NON-CDC: HCPCS

## 2020-09-23 PROCEDURE — 45378 DIAGNOSTIC COLONOSCOPY: CPT | Mod: ,,, | Performed by: INTERNAL MEDICINE

## 2020-09-23 PROCEDURE — 88305 TISSUE EXAM BY PATHOLOGIST: CPT | Performed by: PATHOLOGY

## 2020-09-23 PROCEDURE — 45378 DIAGNOSTIC COLONOSCOPY: CPT | Performed by: INTERNAL MEDICINE

## 2020-09-23 PROCEDURE — 63600175 PHARM REV CODE 636 W HCPCS: Performed by: NURSE ANESTHETIST, CERTIFIED REGISTERED

## 2020-09-23 PROCEDURE — D9220A PRA ANESTHESIA: Mod: ANES,,, | Performed by: ANESTHESIOLOGY

## 2020-09-23 PROCEDURE — D9220A PRA ANESTHESIA: Mod: CRNA,,, | Performed by: NURSE ANESTHETIST, CERTIFIED REGISTERED

## 2020-09-23 PROCEDURE — D9220A PRA ANESTHESIA: ICD-10-PCS | Mod: CRNA,,, | Performed by: NURSE ANESTHETIST, CERTIFIED REGISTERED

## 2020-09-23 PROCEDURE — 88305 TISSUE EXAM BY PATHOLOGIST: CPT | Mod: 26,,, | Performed by: PATHOLOGY

## 2020-09-23 PROCEDURE — 37000009 HC ANESTHESIA EA ADD 15 MINS: Performed by: INTERNAL MEDICINE

## 2020-09-23 PROCEDURE — 37000008 HC ANESTHESIA 1ST 15 MINUTES: Performed by: INTERNAL MEDICINE

## 2020-09-23 PROCEDURE — S0028 INJECTION, FAMOTIDINE, 20 MG: HCPCS | Performed by: ANESTHESIOLOGY

## 2020-09-23 PROCEDURE — 45378 PR COLONOSCOPY,DIAGNOSTIC: ICD-10-PCS | Mod: ,,, | Performed by: INTERNAL MEDICINE

## 2020-09-23 PROCEDURE — 88342 IMHCHEM/IMCYTCHM 1ST ANTB: CPT | Performed by: PATHOLOGY

## 2020-09-23 PROCEDURE — 25000003 PHARM REV CODE 250: Performed by: ANESTHESIOLOGY

## 2020-09-23 PROCEDURE — 43239 EGD BIOPSY SINGLE/MULTIPLE: CPT | Mod: 51,,, | Performed by: INTERNAL MEDICINE

## 2020-09-23 PROCEDURE — 43239 PR EGD, FLEX, W/BIOPSY, SGL/MULTI: ICD-10-PCS | Mod: 51,,, | Performed by: INTERNAL MEDICINE

## 2020-09-23 PROCEDURE — 88342 IMHCHEM/IMCYTCHM 1ST ANTB: CPT | Mod: 26,,, | Performed by: PATHOLOGY

## 2020-09-23 PROCEDURE — 27201423 OPTIME MED/SURG SUP & DEVICES STERILE SUPPLY: Performed by: INTERNAL MEDICINE

## 2020-09-23 PROCEDURE — 25000003 PHARM REV CODE 250: Performed by: NURSE ANESTHETIST, CERTIFIED REGISTERED

## 2020-09-23 PROCEDURE — D9220A PRA ANESTHESIA: ICD-10-PCS | Mod: ANES,,, | Performed by: ANESTHESIOLOGY

## 2020-09-23 PROCEDURE — 88342 CHG IMMUNOCYTOCHEMISTRY: ICD-10-PCS | Mod: 26,,, | Performed by: PATHOLOGY

## 2020-09-23 PROCEDURE — 88305 TISSUE EXAM BY PATHOLOGIST: ICD-10-PCS | Mod: 26,,, | Performed by: PATHOLOGY

## 2020-09-23 PROCEDURE — 43239 EGD BIOPSY SINGLE/MULTIPLE: CPT | Performed by: INTERNAL MEDICINE

## 2020-09-23 RX ORDER — PROPOFOL 10 MG/ML
VIAL (ML) INTRAVENOUS
Status: DISCONTINUED | OUTPATIENT
Start: 2020-09-23 | End: 2020-09-23

## 2020-09-23 RX ORDER — MIDAZOLAM HYDROCHLORIDE 1 MG/ML
INJECTION, SOLUTION INTRAMUSCULAR; INTRAVENOUS
Status: DISCONTINUED | OUTPATIENT
Start: 2020-09-23 | End: 2020-09-23

## 2020-09-23 RX ORDER — SODIUM CHLORIDE, SODIUM LACTATE, POTASSIUM CHLORIDE, CALCIUM CHLORIDE 600; 310; 30; 20 MG/100ML; MG/100ML; MG/100ML; MG/100ML
125 INJECTION, SOLUTION INTRAVENOUS CONTINUOUS
Status: DISCONTINUED | OUTPATIENT
Start: 2020-09-23 | End: 2020-09-23 | Stop reason: HOSPADM

## 2020-09-23 RX ORDER — FAMOTIDINE 10 MG/ML
INJECTION INTRAVENOUS
Status: DISCONTINUED
Start: 2020-09-23 | End: 2020-09-23 | Stop reason: HOSPADM

## 2020-09-23 RX ORDER — LIDOCAINE HYDROCHLORIDE 20 MG/ML
INJECTION, SOLUTION EPIDURAL; INFILTRATION; INTRACAUDAL; PERINEURAL
Status: DISCONTINUED | OUTPATIENT
Start: 2020-09-23 | End: 2020-09-23

## 2020-09-23 RX ORDER — SODIUM CHLORIDE, SODIUM LACTATE, POTASSIUM CHLORIDE, CALCIUM CHLORIDE 600; 310; 30; 20 MG/100ML; MG/100ML; MG/100ML; MG/100ML
INJECTION, SOLUTION INTRAVENOUS CONTINUOUS
Status: DISCONTINUED | OUTPATIENT
Start: 2020-09-23 | End: 2020-09-23 | Stop reason: HOSPADM

## 2020-09-23 RX ORDER — FAMOTIDINE 10 MG/ML
20 INJECTION INTRAVENOUS ONCE
Status: COMPLETED | OUTPATIENT
Start: 2020-09-23 | End: 2020-09-23

## 2020-09-23 RX ORDER — DIPHENHYDRAMINE HYDROCHLORIDE 50 MG/ML
12.5 INJECTION INTRAMUSCULAR; INTRAVENOUS
Status: DISCONTINUED | OUTPATIENT
Start: 2020-09-23 | End: 2020-09-23 | Stop reason: HOSPADM

## 2020-09-23 RX ORDER — LIDOCAINE HYDROCHLORIDE 10 MG/ML
1 INJECTION, SOLUTION EPIDURAL; INFILTRATION; INTRACAUDAL; PERINEURAL ONCE
Status: DISCONTINUED | OUTPATIENT
Start: 2020-09-23 | End: 2020-09-23 | Stop reason: HOSPADM

## 2020-09-23 RX ORDER — ONDANSETRON 2 MG/ML
4 INJECTION INTRAMUSCULAR; INTRAVENOUS DAILY PRN
Status: DISCONTINUED | OUTPATIENT
Start: 2020-09-23 | End: 2020-09-23 | Stop reason: HOSPADM

## 2020-09-23 RX ADMIN — LIDOCAINE HYDROCHLORIDE 100 MG: 20 INJECTION, SOLUTION EPIDURAL; INFILTRATION; INTRACAUDAL; PERINEURAL at 10:09

## 2020-09-23 RX ADMIN — PROPOFOL 50 MG: 10 INJECTION, EMULSION INTRAVENOUS at 10:09

## 2020-09-23 RX ADMIN — PROPOFOL 50 MG: 10 INJECTION, EMULSION INTRAVENOUS at 11:09

## 2020-09-23 RX ADMIN — MIDAZOLAM HYDROCHLORIDE 2 MG: 1 INJECTION, SOLUTION INTRAMUSCULAR; INTRAVENOUS at 10:09

## 2020-09-23 RX ADMIN — SODIUM CHLORIDE, SODIUM LACTATE, POTASSIUM CHLORIDE, CALCIUM CHLORIDE: 600; 310; 30; 20 INJECTION, SOLUTION INTRAVENOUS at 10:09

## 2020-09-23 RX ADMIN — FAMOTIDINE 20 MG: 10 INJECTION INTRAVENOUS at 09:09

## 2020-09-23 NOTE — H&P
"   Office Visit    8/24/2020   [facility] - Gastro     Hao Roger MD  Gastroenterology Gastroesophageal reflux disease, esophagitis presence not specified +4 more  Dx Follow-up ; Referred by Lori Cool MD  Reason for Visit                       Additional Documentation  Vitals:  /73 (BP Location: Left arm, Patient Position: Sitting, BP Method: Medium (Automatic))   Pulse 70   Temp 98.2 °F (36.8 °C) (Temporal)   Resp 18   Ht 5' 6" (1.676 m)   Wt 83.9 kg (185 lb)   LMP (LMP Unknown)   SpO2 95%   BMI 29.86 kg/m²   BSA 1.98 m²   Flowsheets:  Anthropometrics      SmartForms:  [facility] AMB - FALL RISK    [facility] DEVANG HM TOPICS ADVANCED      Encounter Info:  Billing Info,   History,   Allergies,   Detailed Report           Instructions  She has a family history of colon cancer and in addition the upper GI series showed the hiatal hernia and she will be scheduled for upper endoscopy and colonoscopy. She will make a food and avoid the offending foods she can continue the pantoprazole. She continues her reflux regimen medications             After Visit Summary (Printed 8/24/2020)     Progress Notes  Hao Roger MD (Physician)  Gastroenterology  8/24/2020 9:00 AM  Signed   Subjective:   Patient ID: Josseline Degroot is a 60 y.o. female.   Chief Complaint: Follow-up   The upper GI x-rays revealed a hiatal hernia with problem gastroesophageal reflux. She is on the pantoprazole but still complains of pyrosis with occasional nausea without vomiting. She denies a precipitating factor. She denies dysphagia aspiration. She generally has daily bowel movements that her semi-solid occasional with urgency 4-5 times per day. They are not nocturnal.. She used to have a colonoscopy every 5 years because her father had colon cancer in his 60s. Six years ago she had a colonoscopy I woke up during the colonoscopy . He has severe pain and she has been afraid to have further GI evaluation since then. She " denies hematemesis hematochezia jaundice or bleeding.     Allergies:   Review of patient's allergies indicates:   No Known Allergies   Medications:   Current Outpatient Medications:    ascorbic acid/zinc (ZINC WITH VITAMIN C ORAL), Take by mouth., Disp: , Rfl:    buPROPion (WELLBUTRIN XL) 150 MG TB24 tablet, Take 150 mg by mouth once daily., Disp: , Rfl:    ergocalciferol (ERGOCALCIFEROL) 50,000 unit Cap, Take 1 capsule (50,000 Units total) by mouth every 7 days., Disp: 4 capsule, Rfl: 2    estradiol (ESTRACE) 0.5 MG tablet, Take 1 tablet (0.5 mg total) by mouth once daily., Disp: 90 tablet, Rfl: 3    lisinopriL (PRINIVIL,ZESTRIL) 20 MG tablet, Take 1 tablet (20 mg total) by mouth once daily., Disp: 90 tablet, Rfl: 3    MAGTAB 84 mg TbSR, TAKE 1 TABLET ONCE DAILY, Disp: 90 tablet, Rfl: 3    omeprazole (PRILOSEC) 40 MG capsule, Take 1 capsule by mouth daily as needed., Disp: , Rfl:    rosuvastatin (CRESTOR) 10 MG tablet, Take 1 tablet (10 mg total) by mouth once daily., Disp: 90 tablet, Rfl: 3    sucralfate (CARAFATE) 1 gram tablet, Take 1 tablet (1 g total) by mouth 4 (four) times daily before meals and nightly., Disp: 360 tablet, Rfl: 3    umeclidinium-vilanterol (ANORO ELLIPTA) 62.5-25 mcg/actuation DsDv, Inhale 1 puff into the lungs once daily., Disp: 3 each, Rfl: 3    VENTOLIN HFA 90 mcg/actuation inhaler, Inhale 2 puffs into the lungs every 4 (four) hours as needed., Disp: , Rfl: 0    sodium,potassium,mag sulfates (SUPREP BOWEL PREP KIT) 17.5-3.13-1.6 gram SolR, Take 177 mLs by mouth once daily. for 2 days, Disp: 1 kit, Rfl: 0   No current facility-administered medications for this visit.   Facility-Administered Medications Ordered in Other Visits:    0.9% NaCl infusion, , Intravenous, Continuous, Sergey Perez MD, Stopped at 02/04/20 1110    lactated ringers infusion, , Intravenous, Once PRN, Sergey Perez MD         Past Medical History:   Diagnosis Date    Colon polyp     COPD (chronic  obstructive pulmonary disease)     Depression     GERD (gastroesophageal reflux disease)     Hyperlipidemia     Hypertension          Past Surgical History:   Procedure Laterality Date    COLONOSCOPY      HYSTERECTOMY      TOTAL    INJECTION OF ANESTHETIC AGENT AROUND MEDIAL BRANCH NERVES INNERVATING LUMBAR FACET JOINT Right 2/4/2020    Procedure: Block-nerve-medial branch-lumbar; Surgeon: Sergey Perez MD; Location: [facility] OR; Service: Pain Management; Laterality: Right; L3,4,5    RADIOFREQUENCY ABLATION OF LUMBAR MEDIAL BRANCH NERVE AT SINGLE LEVEL Right 2/12/2020    Procedure: Radiofrequency Ablation, Nerve, Spinal, Lumbar, Medial Branch, 1 Level; Surgeon: Sergey Perez MD; Location: [facility] OR; Service: Pain Management; Laterality: Right; L3,4,5   Review of Systems   Constitutional: Negative for appetite change, fever and unexpected weight change.   HENT: Negative for trouble swallowing.   No jaundice.   Respiratory: Negative for cough, shortness of breath and wheezing.   She is a former cigarette smoker. She denies dysphagia aspiration or hemoptysis. She has occasional coughing and sputum production has pulmonary inhalers. She denies a precipitating factor. She states she is not using tobacco alcohol at this time. She denies dyspnea on exertion.   Cardiovascular: Negative for chest pain.   She denies exertional chest pain or rhythm disturbance.   Gastrointestinal: Positive for abdominal pain and nausea. Negative for abdominal distention, anal bleeding, blood in stool, constipation, diarrhea and rectal pain.   She is on the Protonix but still is having symptoms of regurgitation and pyrosis but she denies aspiration fever chills.   Musculoskeletal: Positive for arthralgias and back pain. Negative for neck pain.   Skin: Negative for pallor and rash.   Neurological: Negative for dizziness, seizures, syncope, speech difficulty, weakness and numbness.   Hematological: Negative for adenopathy.   Psychiatric/Behavioral:  Negative for confusion.       Objective:    Physical Exam   Vitals signs reviewed.   Constitutional:   Appearance: She is well-developed.   Comments: Well-nourished well-hydrated afebrile nonicteric white female. She is normocephalic. Pupils are normal. She is oriented x3. She can relate her history and answer questions appropriately. She is sitting comfortably in the chair.   HENT:   Head: Normocephalic.   Eyes:   Pupils: Pupils are equal, round, and reactive to light.   Neck:   Musculoskeletal: Normal range of motion and neck supple.   Thyroid: No thyromegaly.   Trachea: No tracheal deviation.   Cardiovascular:   Rate and Rhythm: Normal rate and regular rhythm.   Heart sounds: Normal heart sounds.   Pulmonary:   Effort: Pulmonary effort is normal.   Breath sounds: Normal breath sounds.   Abdominal:   General: Bowel sounds are normal. There is no distension.   Palpations: Abdomen is soft. There is no mass.   Tenderness: There is abdominal tenderness. There is no right CVA tenderness, left CVA tenderness, guarding or rebound.   Hernia: No hernia is present.   Comments: The abdomen is soft mildly obese with mild tenderness in the epigastrium and left upper quadrant. Organomegaly masses are not detected. Bowel sounds normal.   Musculoskeletal: Normal range of motion.   Comments: She can ambulate normally. She can go from the sitting the standing position without difficulty.   Lymphadenopathy:   Cervical: No cervical adenopathy.   Skin:   General: Skin is warm and dry.   Neurological:   Mental Status: She is alert and oriented to person, place, and time.   Cranial Nerves: No cranial nerve deficit.   Psychiatric:   Behavior: Behavior normal.       Plan:    Gastroesophageal reflux disease, esophagitis presence not specified   Diarrhea, unspecified type   History of colon polyps   Family history of colon cancer   Other orders   - sodium,potassium,mag sulfates (SUPREP BOWEL PREP KIT) 17.5-3.13-1.6 gram SolR; Take 177 mLs  by mouth once daily. for 2 days Dispense: 1 kit; Refill: 0   She will continue her reflux regimen. She continues her current medications diet and activities. She will make a food diary and avoid the offending foods. She will be scheduled for upper endoscopy and colonoscopy. She has good health knowledge. She understands the procedures. Specifically she realizes there is an extremely small incidence of bleeding perforation or aspiration.     Other Notes  All notes               Communications    Letter sent to Lori Cool MD   AMB Visit Summary: Provider Version                                          Not recorded                                 All Charges for This Encounter  Code Description Service Date Service Provider Modifiers Qty   10789 SC OFFICE/OUTPT VISIT,EST,LEVL IV 8/24/2020 Hao Roger MD S$GLB 1   823847130 SC PBB SHADOW E&M-EST. PATIENT-LVL IV 8/24/2020 Hao Roger MD PBBFAC 1   3078F SC MOST RECENT DIASTOLIC BLOOD PRESSURE < 80 MM HG 8/24/2020 Hao Roger MD S$GLB, CPTII 1   3074F SC MOST RECENT SYSTOLIC BLOOD PRESSURE < 130 MM HG 8/24/2020 Hao Roger MD S$GLB, CPTII 1   3008F SC BODY MASS INDEX (BMI) DOCUMENTED 8/24/2020 Hao Roger MD S$GLB, CPTII 1        Level of Service  Level of Service   SC OFFICE/OUTPT VISIT,EST,LEVL IV [81303]             BestPractice Advisories  Click to view BestPractice Advisory history        AVS Reports  Date/Time Report Action User   8/24/2020 10:22 AM After Visit Summary Printed Aarti Castaneda LPN        Encounter-Level Documents - 08/24/2020:  After Visit Summary - Document on 8/24/2020 10:22 AM by Aarti Castaneda LPN: After Visit Summary                                         Orders Placed  None     Medication Changes    sodium, potassium,mag sulfates 17.5-3.13-1.6 gram 177 mLs Oral Daily     Medication List        Fall Risk  Patient Mobility Status: Ambulatory     Number of falls in the past 12 months?: 0   Fall Risk?: No           Visit  Diagnoses    Gastroesophageal reflux disease, esophagitis presence not specified   Diarrhea, unspecified type   History of colon polyps   Family history of colon cancer   Hiatal hernia     Problem List                    She is here for upper endoscopy and colonoscopy.  She has a family history of colon cancer.  She denies significant colon symptoms.  She generally has for 3-4 bowel movements per day.  She was found have a hiatal hernia gastroesophageal reflux and poor response to medical therapy.  She has good health knowledge.  She understands the procedures of upper endoscopy and colonoscopy.  Specifically she realizes there is an extremely small incidence of bleeding perforation or aspiration.  History and physical are unchanged.  Physical examination.  Well-nourished well-hydrated afebrile nonicteric white female.  She is normocephalic.  Pupils are normal.  Lungs reveal symmetrical respirations.  Heart reveals regular rhythm.  The abdomen is soft with mild tenderness in the epigastrium.  Organomegaly masses are not detected.  Bowel sounds are normal.  Neurologic reveals she is oriented x3.

## 2020-09-23 NOTE — ANESTHESIA PREPROCEDURE EVALUATION
09/23/2020  Josseline Degroot is a 60 y.o., female.    Anesthesia Evaluation    I have reviewed the Patient Summary Reports.    I have reviewed the Nursing Notes. I have reviewed the NPO Status.   I have reviewed the Medications.     Review of Systems  Social:  Former Smoker    Cardiovascular:   Hypertension    Pulmonary:   COPD    Hepatic/GI:   GERD    Musculoskeletal:   Arthritis     Psych:   anxiety depression          Physical Exam  General:  Well nourished    Airway/Jaw/Neck:  Airway Findings: Mouth Opening: Normal Tongue: Normal  Mallampati: II  TM Distance: Normal, at least 6 cm       Chest/Lungs:  Chest/Lungs Findings: Clear to auscultation     Heart/Vascular:  Heart Findings: Rate: Normal  Rhythm: Regular Rhythm        Mental Status:  Mental Status Findings:         Anesthesia Plan  Type of Anesthesia, risks & benefits discussed:  Anesthesia Type:  general  Patient's Preference:   Intra-op Monitoring Plan: standard ASA monitors  Intra-op Monitoring Plan Comments:   Post Op Pain Control Plan: IV/PO Opioids PRN  Post Op Pain Control Plan Comments:   Induction:    Beta Blocker:  Patient is not currently on a Beta-Blocker (No further documentation required).       Informed Consent:  Anesthesia consent signed with patient.  ASA Score: 3     Day of Surgery Review of History & Physical: I have interviewed and examined the patient. I have reviewed the patient's H&P dated:            Ready For Surgery From Anesthesia Perspective.

## 2020-09-23 NOTE — TRANSFER OF CARE
"Anesthesia Transfer of Care Note    Patient: Josseline Degroot    Procedure(s) Performed: Procedure(s) (LRB):  EGD (ESOPHAGOGASTRODUODENOSCOPY) (N/A)  COLONOSCOPY (N/A)    Patient location: PACU    Anesthesia Type: general    Transport from OR: Transported from OR on 2-3 L/min O2 by NC with adequate spontaneous ventilation    Post pain: adequate analgesia    Post assessment: no apparent anesthetic complications    Post vital signs: stable    Level of consciousness: awake, alert and oriented    Nausea/Vomiting: no nausea/vomiting    Complications: none    Transfer of care protocol was followed      Last vitals:   Visit Vitals  /75 (BP Location: Right arm, Patient Position: Lying)   Pulse 62   Temp 36.8 °C (98.2 °F) (Oral)   Resp 20   Ht 5' 6" (1.676 m)   Wt 83.9 kg (185 lb)   LMP  (LMP Unknown)   SpO2 98%   Breastfeeding No   BMI 29.86 kg/m²     "

## 2020-09-23 NOTE — DISCHARGE SUMMARY
Upper endoscopy revealed gastroesophageal reflux a small hiatal hernia and gastritis.  The biopsies are pending for H pylori.  She will continue her reflux regimen and current medications.  She will make a food diary avoid the offending foods.  She will add more fiber to the diet.  Diverticulosis revealed hemorrhoids and diverticulosis.  She has a history of colon polyps and her father had colon polyps in addition.  Next colonoscopy in 5 years.  She was told previously that she should have a colonoscopy every 5 years and it was because her father had very atypical polyps.  She will try to obtain the previous records.

## 2020-09-23 NOTE — ANESTHESIA POSTPROCEDURE EVALUATION
Anesthesia Post Evaluation    Patient: Josseline Degroot    Procedure(s) Performed: Procedure(s) (LRB):  EGD (ESOPHAGOGASTRODUODENOSCOPY) (N/A)  COLONOSCOPY (N/A)    Final Anesthesia Type: general    Patient location during evaluation: PACU  Patient participation: Yes- Able to Participate  Level of consciousness: awake and alert  Post-procedure vital signs: reviewed and stable  Pain management: adequate  Airway patency: patent    PONV status at discharge: No PONV  Anesthetic complications: no      Cardiovascular status: blood pressure returned to baseline  Respiratory status: unassisted  Hydration status: euvolemic  Follow-up not needed.          Vitals Value Taken Time   /43 09/23/20 1202   Temp 36.6 °C (97.9 °F) 09/23/20 1135   Pulse 73 09/23/20 1206   Resp 11 09/23/20 1206   SpO2 99 % 09/23/20 1206         Event Time   Out of Recovery 12:00:00         Pain/Noy Score: Noy Score: 8 (9/23/2020 11:45 AM)  Modified Noy Score: 19 (9/23/2020 12:19 PM)

## 2020-09-23 NOTE — [employer] PATIENT INSTRUCTIONS
Discharge Summary/Instructions after an Endoscopic Procedure  Patient Name: Josseline Degroot  Patient MRN: [id number]  Patient YOB: 1960  Wednesday, September 23, 2020  Hao Roger MD  RESTRICTIONS:  During your procedure today, you received medications for sedation.  These   medications may affect your judgment, balance and coordination.  Therefore,   for 24 hours, you have the following restrictions:   - DO NOT drive a car, operate machinery, make legal/financial decisions,   sign important papers or drink alcohol.    ACTIVITY:  Today: no heavy lifting, straining or running due to procedural   sedation/anesthesia.  The following day: return to full activity including work.  DIET:  Eat and drink normally unless instructed otherwise.     TREATMENT FOR COMMON SIDE EFFECTS:  - Mild abdominal pain, nausea, belching, bloating or excessive gas:  rest,   eat lightly and use a heating pad.  - Sore Throat: treat with throat lozenges and/or gargle with warm salt   water.  - Because air was used during the procedure, expelling large amounts of air   from your rectum or belching is normal.  - If a bowel prep was taken, you may not have a bowel movement for 1-3 days.    This is normal.  SYMPTOMS TO WATCH FOR AND REPORT TO YOUR PHYSICIAN:  1. Abdominal pain or bloating, other than gas cramps.  2. Chest pain.  3. Back pain.  4. Signs of infection such as: chills or fever occurring within 24 hours   after the procedure.  5. Rectal bleeding, which would show as bright red, maroon, or black stools.   (A tablespoon of blood from the rectum is not serious, especially if   hemorrhoids are present.)  6. Vomiting.  7. Weakness or dizziness.  GO DIRECTLY TO THE NEAREST EMERGENCY ROOM IF YOU HAVE ANY OF THE FOLLOWING:      Difficulty breathing              Chills and/or fever over 101 F   Persistent vomiting and/or vomiting blood   Severe abdominal pain   Severe chest pain   Black, tarry stools   Bleeding- more than one  tablespoon   Any other symptom or condition that you feel may need urgent attention  Your doctor recommends these additional instructions:  If any biopsies were taken, your doctors clinic will contact you in 1 to 2   weeks with any results.  - Discharge patient to home (ambulatory).   - Resume previous diet.  For questions, problems or results please call your physician - Hao Roger MD at Work:  [phone].  [facility] EMERGENCY ROOM PHONE NUMBER: [phone]  IF A COMPLICATION OR EMERGENCY SITUATION ARISES AND YOU ARE UNABLE TO REACH   YOUR PHYSICIAN - GO DIRECTLY TO THE EMERGENCY ROOM.  MD Hao Thompson MD  9/23/2020 11:38:41 AM  This report has been verified and signed electronically.  [employer]

## 2020-09-23 NOTE — OP NOTE
Procedure.  Esophageal gastroduodenoscopy.  Indications dyspepsia pyrosis history of hiatal hernia gastroesophageal reflux and poor response to medical therapy.  She has good health knowledge and she understands the procedures of upper endoscopy.  Specifically she realizes there is an extremely small incidence of bleeding perforation or aspiration.  Anesthesia.  Monitored anesthesia coverage.  Procedure.  With the patient the procedure room left lateral supine position.  The Olympus video upper endoscope was passed without difficulty.  The hyperemic gastroesophageal junction was at 37-38 cm with the SQ junction at 37-30 cm.  Diaphragmatic hiatus appeared be at 39-40 cm.  The cardia revealed a hiatal hernia.  The cardia body and antrum was hyperemic.  There was minimal retained secretions.  The pre-pyloric mucosa was friable and multiple biopsies were obtained.  Biopsies were obtained to the lesser curvature greater curvature and antrum.  There was minimal deformity of the pylorus.  First and parts of the duodenum were normal.  Patient tolerated the procedure well.  Impression 1.  Hiatal hernia 2.  Esophagitis LA grade A 3.  Gastritis.  Procedure.  Colonoscopy.  Indications history colon polyps.  She has had occasional bowel irregularity.  Her father has atypical polyps.  She has good health knowledge.  She understands the procedures of colonoscopy.  Specifically she realizes there is an extremely small incidence of bleeding perforation or aspiration.  Anesthesia.  Monitored anesthesia coverage.  Procedure.  With the patient the procedure room left lateral supine position the perianal area was examined.  The palpable hemorrhoids.  The Olympus colonoscope was passed to the cecum identified by the ileocecal and triradiate fold.  Mild washing was required in areas the colon.  There was very careful circumferential inspection mucosa as the scope was withdrawn.  Definite ulcers polyps or bleeding were not seen. There were  a few diverticuli in the sigmoid colon.  The scope was retroflexed.  The hemorrhoids were identified.  Patient tolerated the procedure well.  Impression 1.  Hemorrhoids 2.  Diverticulosis 3.  History of colon polyps.

## 2020-09-24 NOTE — [employer]ATION PATIENT INSTRUCTIONS
Discharge Summary/Instructions after an Endoscopic Procedure  Patient Name: Josseline Degroot  Patient MRN: [id number]  Patient YOB: 1960  Wednesday, September 23, 2020  Hao Roger MD  RESTRICTIONS:  During your procedure today, you received medications for sedation.  These   medications may affect your judgment, balance and coordination.  Therefore,   for 24 hours, you have the following restrictions:   - DO NOT drive a car, operate machinery, make legal/financial decisions,   sign important papers or drink alcohol.    ACTIVITY:  Today: no heavy lifting, straining or running due to procedural   sedation/anesthesia.  The following day: return to full activity including work.  DIET:  Eat and drink normally unless instructed otherwise.     TREATMENT FOR COMMON SIDE EFFECTS:  - Mild abdominal pain, nausea, belching, bloating or excessive gas:  rest,   eat lightly and use a heating pad.  - Sore Throat: treat with throat lozenges and/or gargle with warm salt   water.  - Because air was used during the procedure, expelling large amounts of air   from your rectum or belching is normal.  - If a bowel prep was taken, you may not have a bowel movement for 1-3 days.    This is normal.  SYMPTOMS TO WATCH FOR AND REPORT TO YOUR PHYSICIAN:  1. Abdominal pain or bloating, other than gas cramps.  2. Chest pain.  3. Back pain.  4. Signs of infection such as: chills or fever occurring within 24 hours   after the procedure.  5. Rectal bleeding, which would show as bright red, maroon, or black stools.   (A tablespoon of blood from the rectum is not serious, especially if   hemorrhoids are present.)  6. Vomiting.  7. Weakness or dizziness.  GO DIRECTLY TO THE NEAREST EMERGENCY ROOM IF YOU HAVE ANY OF THE FOLLOWING:      Difficulty breathing              Chills and/or fever over 101 F   Persistent vomiting and/or vomiting blood   Severe abdominal pain   Severe chest pain   Black, tarry stools   Bleeding- more than one  tablespoon   Any other symptom or condition that you feel may need urgent attention  Your doctor recommends these additional instructions:  If any biopsies were taken, your doctors clinic will contact you in 1 to 2   weeks with any results.  - Discharge patient to home (ambulatory).   - Resume previous diet.  For questions, problems or results please call your physician - Hao Roger MD at Work:  [phone].  [facility] EMERGENCY ROOM PHONE NUMBER: [phone]  IF A COMPLICATION OR EMERGENCY SITUATION ARISES AND YOU ARE UNABLE TO REACH   YOUR PHYSICIAN - GO DIRECTLY TO THE EMERGENCY ROOM.  MD Hao Thompson MD  9/23/2020 11:32:12 AM  [employer]

## 2020-09-25 ENCOUNTER — PATIENT MESSAGE (OUTPATIENT)
Dept: GASTROENTEROLOGY | Facility: CLINIC | Age: 60
End: 2020-09-25

## 2020-09-28 LAB
FINAL PATHOLOGIC DIAGNOSIS: NORMAL
GROSS: NORMAL
MICROSCOPIC EXAM: NORMAL

## 2020-09-29 ENCOUNTER — PATIENT OUTREACH (OUTPATIENT)
Dept: ADMINISTRATIVE | Facility: OTHER | Age: 60
End: 2020-09-29

## 2020-09-29 ENCOUNTER — OFFICE VISIT (OUTPATIENT)
Dept: GASTROENTEROLOGY | Facility: CLINIC | Age: 60
End: 2020-09-29
Payer: COMMERCIAL

## 2020-09-29 VITALS
WEIGHT: 184 LBS | SYSTOLIC BLOOD PRESSURE: 104 MMHG | DIASTOLIC BLOOD PRESSURE: 72 MMHG | HEART RATE: 64 BPM | TEMPERATURE: 97 F | HEIGHT: 66 IN | OXYGEN SATURATION: 95 % | RESPIRATION RATE: 18 BRPM | BODY MASS INDEX: 29.57 KG/M2

## 2020-09-29 DIAGNOSIS — K76.0 FATTY LIVER: ICD-10-CM

## 2020-09-29 DIAGNOSIS — K57.90 DIVERTICULOSIS: ICD-10-CM

## 2020-09-29 DIAGNOSIS — R19.7 DIARRHEA, UNSPECIFIED TYPE: ICD-10-CM

## 2020-09-29 DIAGNOSIS — K44.9 HIATAL HERNIA: ICD-10-CM

## 2020-09-29 DIAGNOSIS — K21.9 GASTROESOPHAGEAL REFLUX DISEASE, UNSPECIFIED WHETHER ESOPHAGITIS PRESENT: Primary | Chronic | ICD-10-CM

## 2020-09-29 DIAGNOSIS — Z86.010 HISTORY OF COLON POLYPS: ICD-10-CM

## 2020-09-29 PROCEDURE — 99214 OFFICE O/P EST MOD 30 MIN: CPT | Mod: S$GLB,,, | Performed by: INTERNAL MEDICINE

## 2020-09-29 PROCEDURE — 3008F BODY MASS INDEX DOCD: CPT | Mod: CPTII,S$GLB,, | Performed by: INTERNAL MEDICINE

## 2020-09-29 PROCEDURE — 3008F PR BODY MASS INDEX (BMI) DOCUMENTED: ICD-10-PCS | Mod: CPTII,S$GLB,, | Performed by: INTERNAL MEDICINE

## 2020-09-29 PROCEDURE — 3078F PR MOST RECENT DIASTOLIC BLOOD PRESSURE < 80 MM HG: ICD-10-PCS | Mod: CPTII,S$GLB,, | Performed by: INTERNAL MEDICINE

## 2020-09-29 PROCEDURE — 99999 PR PBB SHADOW E&M-EST. PATIENT-LVL IV: CPT | Mod: PBBFAC,,, | Performed by: INTERNAL MEDICINE

## 2020-09-29 PROCEDURE — 99999 PR PBB SHADOW E&M-EST. PATIENT-LVL IV: ICD-10-PCS | Mod: PBBFAC,,, | Performed by: INTERNAL MEDICINE

## 2020-09-29 PROCEDURE — 99214 PR OFFICE/OUTPT VISIT, EST, LEVL IV, 30-39 MIN: ICD-10-PCS | Mod: S$GLB,,, | Performed by: INTERNAL MEDICINE

## 2020-09-29 PROCEDURE — 3074F SYST BP LT 130 MM HG: CPT | Mod: CPTII,S$GLB,, | Performed by: INTERNAL MEDICINE

## 2020-09-29 PROCEDURE — 3074F PR MOST RECENT SYSTOLIC BLOOD PRESSURE < 130 MM HG: ICD-10-PCS | Mod: CPTII,S$GLB,, | Performed by: INTERNAL MEDICINE

## 2020-09-29 PROCEDURE — 3078F DIAST BP <80 MM HG: CPT | Mod: CPTII,S$GLB,, | Performed by: INTERNAL MEDICINE

## 2020-09-29 RX ORDER — CIMETIDINE 300 MG/1
300 TABLET, FILM COATED ORAL NIGHTLY
Qty: 90 TABLET | Refills: 3 | Status: SHIPPED | OUTPATIENT
Start: 2020-09-29 | End: 2021-04-26

## 2020-09-29 NOTE — LETTER
October 2, 2020      Lori Cool MD  [address] [place] [address]           [facility]  [address]  [place] [address]  Phone: [phone]  Fax: [phone]          Patient: Josseline Degroot   MR Number: [id number]   YOB: 1960   Date of Visit: 9/29/2020       Dear Dr. Lori Cool:    Thank you for referring Josseline Degroot to me for evaluation. Attached you will find relevant portions of my assessment and plan of care.    If you have questions, please do not hesitate to call me. I look forward to following Josseline Degroot along with you.    Sincerely,    Hao Roger MD    Enclosure  CC:  No Recipients    If you would like to receive this communication electronically, please contact [email or web address] or [phone] to request more information on [facility] access.    For providers and/or their staff who would like to refer a patient to [facility], please contact us through our one-stop-shop provider referral line, [facility], at [phone].    If you feel you have received this communication in error or would no longer like to receive these types of communications, please e-mail [email or web address]

## 2020-09-29 NOTE — PATIENT INSTRUCTIONS
She was found have a hiatal hernia and gastroesophageal reflux.  The biopsies were negative for H pylori.  She will make a food diary and avoid the offending foods.  She continues the reflux regimen with elevation of the head of the bed.  She will take the omeprazole in the morning and she started on the Tagamet 300 mg HS.  Colonoscopy revealed hemorrhoids and diverticulosis.  She will add more fiber to the diet.  She is given a booklet on diverticulosis.

## 2020-09-29 NOTE — PROGRESS NOTES
Subjective:       Patient ID: Josseline Degroot is a 60 y.o. female.    Chief Complaint: Follow-up    Upper endoscopy revealed a hiatal hernia gastroesophageal reflux and gastritis.  The biopsies were negative for H pylori.  Colonoscopy revealed hemorrhoids and diverticulosis.  Recent ultrasound revealed a fatty liver but a normal gallbladder.  She has been on the vitamin-E.  She is trying to modify her diet.  She is trying to avoid the offensive foods.  She is trying to reduce her weight.  She has occasional breakthrough symptoms with pyrosis and dyspepsia.  She denies hematemesis hematochezia dysphagia or aspiration.  Her weight remains stable.  She denies fever chills.  She generally has daily bowel movements with occasional episodes of semi solid bowel movements.  She cannot pinpoint a precipitating factor.  She denies incontinence or nocturnal bowel movements.      Allergies:  Review of patient's allergies indicates:  No Known Allergies    Medications:    Current Outpatient Medications:     ascorbic acid/zinc (ZINC WITH VITAMIN C ORAL), Take by mouth., Disp: , Rfl:     buPROPion (WELLBUTRIN XL) 150 MG TB24 tablet, Take 150 mg by mouth once daily., Disp: , Rfl:     ergocalciferol (ERGOCALCIFEROL) 50,000 unit Cap, Take 1 capsule (50,000 Units total) by mouth every 7 days., Disp: 4 capsule, Rfl: 2    estradiol (ESTRACE) 0.5 MG tablet, Take 1 tablet (0.5 mg total) by mouth once daily., Disp: 90 tablet, Rfl: 3    lisinopriL (PRINIVIL,ZESTRIL) 20 MG tablet, Take 1 tablet (20 mg total) by mouth once daily., Disp: 90 tablet, Rfl: 3    MAGTAB 84 mg TbSR, TAKE 1 TABLET ONCE DAILY, Disp: 90 tablet, Rfl: 3    omeprazole (PRILOSEC) 40 MG capsule, Take 1 capsule by mouth daily as needed., Disp: , Rfl:     rosuvastatin (CRESTOR) 10 MG tablet, Take 1 tablet (10 mg total) by mouth once daily., Disp: 90 tablet, Rfl: 3    sucralfate (CARAFATE) 1 gram tablet, Take 1 tablet (1 g total) by mouth 4 (four) times daily before meals  and nightly., Disp: 360 tablet, Rfl: 3    umeclidinium-vilanterol (ANORO ELLIPTA) 62.5-25 mcg/actuation DsDv, Inhale 1 puff into the lungs once daily., Disp: 3 each, Rfl: 3    VENTOLIN HFA 90 mcg/actuation inhaler, Inhale 2 puffs into the lungs every 4 (four) hours as needed., Disp: , Rfl: 0    cimetidine (TAGAMET) 300 MG tablet, Take 1 tablet (300 mg total) by mouth nightly., Disp: 90 tablet, Rfl: 3  No current facility-administered medications for this visit.     Facility-Administered Medications Ordered in Other Visits:     0.9%  NaCl infusion, , Intravenous, Continuous, Sergey Perez MD, Stopped at 02/04/20 1110    Past Medical History:   Diagnosis Date    Colon polyp     COPD (chronic obstructive pulmonary disease)     Depression     GERD (gastroesophageal reflux disease)     Hyperlipidemia     Hypertension        Past Surgical History:   Procedure Laterality Date    BREAST BIOPSY Left     COLONOSCOPY      COLONOSCOPY N/A 9/23/2020    Procedure: COLONOSCOPY;  Surgeon: Hao Roger MD;  Location: [facility] ENDO;  Service: Endoscopy;  Laterality: N/A;    ESOPHAGOGASTRODUODENOSCOPY N/A 9/23/2020    Procedure: EGD (ESOPHAGOGASTRODUODENOSCOPY);  Surgeon: Hao Roger MD;  Location: [facility];  Service: Endoscopy;  Laterality: N/A;    HYSTERECTOMY      TOTAL    INJECTION OF ANESTHETIC AGENT AROUND MEDIAL BRANCH NERVES INNERVATING LUMBAR FACET JOINT Right 2/4/2020    Procedure: Block-nerve-medial branch-lumbar;  Surgeon: Sergey Perez MD;  Location: [facility] OR;  Service: Pain Management;  Laterality: Right;  L3,4,5    OOPHORECTOMY      RADIOFREQUENCY ABLATION OF LUMBAR MEDIAL BRANCH NERVE AT SINGLE LEVEL Right 2/12/2020    Procedure: Radiofrequency Ablation, Nerve, Spinal, Lumbar, Medial Branch, 1 Level;  Surgeon: Sergey Perez MD;  Location: [facility] OR;  Service: Pain Management;  Laterality: Right;  L3,4,5         Review of Systems   Constitutional: Negative for appetite change, fever and unexpected weight  change.   HENT: Negative for trouble swallowing.         No jaundice.   Respiratory: Negative for cough, shortness of breath and wheezing.         She is a former cigarette smoker.  Currently she does not use tobacco.  She states she has COPD.  She has occasional flare ups of coughing and dyspnea and uses or pulmonary inhalers.  She denies aspiration dysphagia hemoptysis.  She denies significant coughing or sputum production except for occasional exacerbations without a precipitating cause.  She denies dyspnea on exertion.   Cardiovascular: Negative for chest pain.        She denies exertional chest pain or rhythm disturbance.  She states that her hyperlipidemia hypertension well controlled.   Gastrointestinal: Positive for nausea. Negative for abdominal distention, abdominal pain, anal bleeding, blood in stool, constipation, diarrhea and rectal pain.        She denies abdominal pain.  She has occasional nausea associated with the pyrosis but she cannot identify precipitating factor such is a specific food although she avoids spicy and fatty foods.  She denies vomiting.   Musculoskeletal: Positive for arthralgias and back pain. Negative for neck pain.   Skin: Negative for pallor and rash.   Neurological: Negative for dizziness, seizures, syncope, speech difficulty, weakness and numbness.   Hematological: Negative for adenopathy.   Psychiatric/Behavioral: Negative for confusion.       Objective:      Physical Exam  Vitals signs reviewed.   Constitutional:       Appearance: She is well-developed.      Comments: Well-nourished well-hydrated afebrile nonicteric white female.  She is sitting comfortably in the chair.  She is oriented x3.  She can relate her history and answer questions appropriately.  She is normocephalic.  Pupils are normal.   HENT:      Head: Normocephalic.   Eyes:      Pupils: Pupils are equal, round, and reactive to light.   Neck:      Musculoskeletal: Normal range of motion and neck supple.       Thyroid: No thyromegaly.      Trachea: No tracheal deviation.   Cardiovascular:      Rate and Rhythm: Normal rate and regular rhythm.      Heart sounds: Normal heart sounds.   Pulmonary:      Effort: Pulmonary effort is normal.      Breath sounds: Normal breath sounds.   Abdominal:      General: Bowel sounds are normal. There is no distension.      Palpations: Abdomen is soft. There is no mass.      Tenderness: There is no abdominal tenderness. There is no right CVA tenderness, left CVA tenderness, guarding or rebound.      Hernia: No hernia is present.   Musculoskeletal: Normal range of motion.      Comments: Ambulate normally.  She can go from the sitting to the standing position without difficulty.   Lymphadenopathy:      Cervical: No cervical adenopathy.   Skin:     General: Skin is warm and dry.   Neurological:      Mental Status: She is alert and oriented to person, place, and time.      Cranial Nerves: No cranial nerve deficit.   Psychiatric:         Behavior: Behavior normal.           Plan:       Gastroesophageal reflux disease, unspecified whether esophagitis present    History of colon polyps    Hiatal hernia    Diarrhea, unspecified type    Fatty liver    Diverticulosis    Other orders  -     cimetidine (TAGAMET) 300 MG tablet; Take 1 tablet (300 mg total) by mouth nightly.  Dispense: 90 tablet; Refill: 3     She will continue her reflux regimen current medications.  She will make a food diary and avoid the offending foods.  She will add more fiber to the diet.  I discussed obesity and fatty liver with the patient.  She will try to reduce her weight and continue her vitamins including vitamin-E.  I discussed diverticulosis with her and she was given a booklet.  Next colonoscopy in 5 years unless she has symptoms.

## 2020-09-29 NOTE — PROGRESS NOTES
Health Maintenance Due   Topic Date Due    HIV Screening  03/07/1975    TETANUS VACCINE  03/07/1976    Shingles Vaccine (1 of 2) 03/07/2008    Influenza Vaccine (1) 08/01/2020     Updates were requested from care everywhere.  Chart was reviewed for overdue Proactive [facility] Encounters (DEVANG) topics (CRS, Breast Cancer Screening, Eye exam)  Health Maintenance has been updated.  LINKS immunization registry triggered.  Immunizations were not able to be reconciled. Patient not found in LINKS.

## 2020-10-02 PROBLEM — K57.90 DIVERTICULOSIS: Status: ACTIVE | Noted: 2020-10-02

## 2020-10-02 PROBLEM — K76.0 FATTY LIVER: Status: ACTIVE | Noted: 2020-10-02

## 2020-11-02 DIAGNOSIS — K21.9 GASTROESOPHAGEAL REFLUX DISEASE: ICD-10-CM

## 2020-11-02 DIAGNOSIS — E78.5 HYPERLIPIDEMIA, UNSPECIFIED HYPERLIPIDEMIA TYPE: ICD-10-CM

## 2020-11-02 DIAGNOSIS — E55.9 VITAMIN D DEFICIENCY: ICD-10-CM

## 2020-11-02 DIAGNOSIS — J43.8 OTHER EMPHYSEMA: Chronic | ICD-10-CM

## 2020-11-03 RX ORDER — LISINOPRIL 20 MG/1
20 TABLET ORAL DAILY
Qty: 90 TABLET | Refills: 3 | Status: SHIPPED | OUTPATIENT
Start: 2020-11-03 | End: 2021-12-02

## 2020-11-03 RX ORDER — ALBUTEROL SULFATE 90 UG/1
2 AEROSOL, METERED RESPIRATORY (INHALATION) EVERY 4 HOURS PRN
Qty: 18 G | Refills: 0 | Status: SHIPPED | OUTPATIENT
Start: 2020-11-03 | End: 2021-03-11

## 2020-11-03 RX ORDER — ROSUVASTATIN CALCIUM 10 MG/1
10 TABLET, COATED ORAL DAILY
Qty: 90 TABLET | Refills: 3 | Status: SHIPPED | OUTPATIENT
Start: 2020-11-03 | End: 2021-05-17

## 2020-11-03 RX ORDER — UMECLIDINIUM BROMIDE AND VILANTEROL TRIFENATATE 62.5; 25 UG/1; UG/1
1 POWDER RESPIRATORY (INHALATION) DAILY
Qty: 3 EACH | Refills: 3 | Status: SHIPPED | OUTPATIENT
Start: 2020-11-03 | End: 2021-03-11

## 2020-11-03 RX ORDER — SUCRALFATE 1 G/1
1 TABLET ORAL
Qty: 360 TABLET | Refills: 3 | Status: SHIPPED | OUTPATIENT
Start: 2020-11-03 | End: 2021-10-28

## 2020-11-03 RX ORDER — ERGOCALCIFEROL 1.25 MG/1
50000 CAPSULE ORAL
Qty: 4 CAPSULE | Refills: 2 | Status: SHIPPED | OUTPATIENT
Start: 2020-11-03 | End: 2021-07-13

## 2020-11-03 NOTE — TELEPHONE ENCOUNTER
----- Message from Melissa Mena MA sent at 11/2/2020 11:25 AM CST -----  Type:  RX Refill Request    Who Called:  Josseline  Refill or New Rx:  refill  RX Name and Strength:   buPROPion (WELLBUTRIN XL) 150 MG TB24 tablet       Sig - Route: Take 150 mg by mouth once daily. - Oral     umeclidinium-vilanterol (ANORO ELLIPTA) 62.5-25 mcg/actuation DsDv 3 each 3 5/9/2018  Sig - Route: Inhale 1 puff into the lungs once daily. - Inhalation     ergocalciferol (ERGOCALCIFEROL) 50,000 unit Cap 4 capsule 2 5/10/2018   Sig - Route: Take 1 capsule (50,000 Units total) by mouth every 7 days. - Oral     estradiol (ESTRACE) 0.5 MG tablet 90 tablet 3 1/7/2020 1/6/2021   Sig - Route: Take 1 tablet (0.5 mg total) by mouth once daily. - Oral     lisinopriL (PRINIVIL,ZESTRIL) 20 MG tablet 90 tablet 3 3/18/2020    Sig - Route: Take 1 tablet (20 mg total) by mouth once daily. - Oral     rosuvastatin (CRESTOR) 10 MG tablet 90 tablet 3 1/7/2020 1/6/2021   Sig - Route: Take 1 tablet (10 mg total) by mouth once daily. - Oral     sucralfate (CARAFATE) 1 gram tablet 360 tablet 3 3/9/2020 3/9/2021   Sig - Route: Take 1 tablet (1 g total) by mouth 4 (four) times daily before meals and nightly. - Oral   VENTOLIN HFA 90 mcg/actuation inhaler  0 3/21/2018    Sig - Route: Inhale 2 puffs into the lungs every 4 (four) hours as needed. - Inhalation     How is the patient currently taking it? (ex. 1XDay):  See Above  Is this a 30 day or 90 day RX:  90  Preferred Pharmacy with phone number:    [facility] - [place], [place] - [facility] [facility] [address]  Phone: [phone] Fax: [phone]  Local or Mail Order:  mail order  Ordering Provider:    Best Call Back Number:  [phone]  Additional Information:

## 2020-11-06 ENCOUNTER — TELEPHONE (OUTPATIENT)
Dept: FAMILY MEDICINE | Facility: CLINIC | Age: 60
End: 2020-11-06

## 2020-11-06 DIAGNOSIS — F41.9 ANXIETY: ICD-10-CM

## 2020-11-06 RX ORDER — BUPROPION HYDROCHLORIDE 150 MG/1
150 TABLET ORAL DAILY
Qty: 30 TABLET | Refills: 11 | Status: CANCELLED | OUTPATIENT
Start: 2020-11-06 | End: 2021-11-06

## 2020-11-06 RX ORDER — BUPROPION HYDROCHLORIDE 150 MG/1
150 TABLET ORAL DAILY
Qty: 30 TABLET | Refills: 0 | Status: SHIPPED | OUTPATIENT
Start: 2020-11-06 | End: 2020-11-30 | Stop reason: SDUPTHER

## 2020-11-06 NOTE — TELEPHONE ENCOUNTER
----- Message from Bonita Garcia MA sent at 11/6/2020 10:21 AM CST -----  Regarding: refill  Contact: patient  Type:  RX Refill Request    Who Called:  patient   Refill or New Rx:  refill  RX Name and Strength:  buPROPion (WELLBUTRIN XL) 150 MG TB24 tablet  How is the patient currently taking it? (ex. 1XDay):    Is this a 30 day or 90 day RX:  requesting a month supply to go to [facility] per patient     Preferred Pharmacy with phone number:      [id number] - [place] [address] AT [facility] [address]  [address] [address]  Phone: [phone] Fax: [phone]            Local or Mail Order:  local   Ordering Provider: Silvia     Best Call Back Number:  [phone] (home)   Additional Information:  please call patient to notify that medicine was called into [facility] per patient

## 2020-11-06 NOTE — TELEPHONE ENCOUNTER
----- Message from Jam Varela sent at 11/6/2020  9:51 AM CST -----  Regarding: refill  Type:  RX Refill Request    Who Called:  pt  Refill or New Rx:  refill  RX Name and Strength:  buPROPion (WELLBUTRIN XL) 150 MG TB24 tablet     How is the patient currently taking it? (ex. 1XDay):  Sig - Route: Take 150 mg by mouth once daily. - Oral   Is this a 30 day or 90 day RX:  30  Preferred Pharmacy with phone number:    [facility] #[id number] - [place], [place] - [address] [place] [address] AT [facility] [address]  [address] [place] [address]  Phone: [phone] Fax: [phone]    Local or Mail Order:  local  Ordering Provider:  Dr Sergio Mariano Call Back Number:  [phone]   Additional Information:  pt is leaving [facility] Monday and only had two days worth of meds left. Please do not send to mail order pharm. Please send one month fill to [facility]. Please call pt with questions.

## 2020-11-30 DIAGNOSIS — F41.9 ANXIETY: ICD-10-CM

## 2020-11-30 NOTE — TELEPHONE ENCOUNTER
----- Message from Lopez Briceno sent at 11/30/2020 11:56 AM CST -----  Regarding: Rx refill  Type:  RX Refill Request    Who Called:  Ptnt  [phone]    Refill or New Rx:  Refill    RX Name and Strength:  buPROPion (WELLBUTRIN XL) 150 MG TB24 tablet 30 tablet 0 11/6/2020  No  Sig - Route: Take 1 tablet (150 mg total) by mouth once daily. - Oral  Sent to pharmacy as: buPROPion (WELLBUTRIN XL) 150 MG TB24 tablet  Class: Normal      How is the patient currently taking it? (ex. 1XDay): See above.    Is this a 30 day or 90 day RX:  See above Requesting 90 day    Preferred Pharmacy with phone number:      [facility] - [address] [address]  Phone: [phone] Fax: [phone]      Local or Mail Order:  Mail      Ordering Provider:  Dr Vega    Best Call Back Number:   Ptnt  [phone]      Additional Information:      Advised needs to speak with the nurse about [facility].  [facility] Vega's office to advise PCP status for ptnt, would not accept from the ptnt.    Please call.

## 2020-12-02 RX ORDER — BUPROPION HYDROCHLORIDE 150 MG/1
150 TABLET ORAL DAILY
Qty: 90 TABLET | Refills: 3 | Status: SHIPPED | OUTPATIENT
Start: 2020-12-02 | End: 2021-12-02

## 2021-01-02 ENCOUNTER — PATIENT MESSAGE (OUTPATIENT)
Dept: FAMILY MEDICINE | Facility: CLINIC | Age: 61
End: 2021-01-02

## 2021-02-01 ENCOUNTER — PATIENT MESSAGE (OUTPATIENT)
Dept: FAMILY MEDICINE | Facility: CLINIC | Age: 61
End: 2021-02-01

## 2021-03-10 ENCOUNTER — PATIENT MESSAGE (OUTPATIENT)
Dept: FAMILY MEDICINE | Facility: CLINIC | Age: 61
End: 2021-03-10

## 2021-03-11 ENCOUNTER — OFFICE VISIT (OUTPATIENT)
Dept: FAMILY MEDICINE | Facility: CLINIC | Age: 61
End: 2021-03-11
Payer: COMMERCIAL

## 2021-03-11 VITALS
DIASTOLIC BLOOD PRESSURE: 70 MMHG | TEMPERATURE: 98 F | BODY MASS INDEX: 29.7 KG/M2 | HEART RATE: 80 BPM | HEIGHT: 66 IN | WEIGHT: 184.81 LBS | RESPIRATION RATE: 12 BRPM | OXYGEN SATURATION: 97 % | SYSTOLIC BLOOD PRESSURE: 109 MMHG

## 2021-03-11 DIAGNOSIS — Z11.59 ENCOUNTER FOR HEPATITIS C SCREENING TEST FOR LOW RISK PATIENT: ICD-10-CM

## 2021-03-11 DIAGNOSIS — R20.2 NUMBNESS AND TINGLING: ICD-10-CM

## 2021-03-11 DIAGNOSIS — M25.50 ARTHRALGIA, UNSPECIFIED JOINT: ICD-10-CM

## 2021-03-11 DIAGNOSIS — Z11.4 SCREENING FOR HIV (HUMAN IMMUNODEFICIENCY VIRUS): ICD-10-CM

## 2021-03-11 DIAGNOSIS — R06.09 DYSPNEA ON EXERTION: Primary | ICD-10-CM

## 2021-03-11 DIAGNOSIS — J43.8 OTHER EMPHYSEMA: ICD-10-CM

## 2021-03-11 DIAGNOSIS — R20.0 NUMBNESS AND TINGLING: ICD-10-CM

## 2021-03-11 DIAGNOSIS — R42 DIZZINESS: ICD-10-CM

## 2021-03-11 PROCEDURE — 3074F PR MOST RECENT SYSTOLIC BLOOD PRESSURE < 130 MM HG: ICD-10-PCS | Mod: CPTII,S$GLB,, | Performed by: FAMILY MEDICINE

## 2021-03-11 PROCEDURE — 3008F BODY MASS INDEX DOCD: CPT | Mod: CPTII,S$GLB,, | Performed by: FAMILY MEDICINE

## 2021-03-11 PROCEDURE — 1126F PR PAIN SEVERITY QUANTIFIED, NO PAIN PRESENT: ICD-10-PCS | Mod: S$GLB,,, | Performed by: FAMILY MEDICINE

## 2021-03-11 PROCEDURE — 3008F PR BODY MASS INDEX (BMI) DOCUMENTED: ICD-10-PCS | Mod: CPTII,S$GLB,, | Performed by: FAMILY MEDICINE

## 2021-03-11 PROCEDURE — 99214 OFFICE O/P EST MOD 30 MIN: CPT | Mod: S$GLB,,, | Performed by: FAMILY MEDICINE

## 2021-03-11 PROCEDURE — 3074F SYST BP LT 130 MM HG: CPT | Mod: CPTII,S$GLB,, | Performed by: FAMILY MEDICINE

## 2021-03-11 PROCEDURE — 1126F AMNT PAIN NOTED NONE PRSNT: CPT | Mod: S$GLB,,, | Performed by: FAMILY MEDICINE

## 2021-03-11 PROCEDURE — 99214 PR OFFICE/OUTPT VISIT, EST, LEVL IV, 30-39 MIN: ICD-10-PCS | Mod: S$GLB,,, | Performed by: FAMILY MEDICINE

## 2021-03-11 PROCEDURE — 3078F DIAST BP <80 MM HG: CPT | Mod: CPTII,S$GLB,, | Performed by: FAMILY MEDICINE

## 2021-03-11 PROCEDURE — 3078F PR MOST RECENT DIASTOLIC BLOOD PRESSURE < 80 MM HG: ICD-10-PCS | Mod: CPTII,S$GLB,, | Performed by: FAMILY MEDICINE

## 2021-03-11 RX ORDER — DICLOFENAC SODIUM 10 MG/G
2 GEL TOPICAL 4 TIMES DAILY PRN
Qty: 100 G | Refills: 1 | Status: SHIPPED | OUTPATIENT
Start: 2021-03-11 | End: 2023-03-08

## 2021-03-12 ENCOUNTER — LAB VISIT (OUTPATIENT)
Dept: LAB | Facility: HOSPITAL | Age: 61
End: 2021-03-12
Attending: FAMILY MEDICINE
Payer: COMMERCIAL

## 2021-03-12 DIAGNOSIS — Z11.59 ENCOUNTER FOR HEPATITIS C SCREENING TEST FOR LOW RISK PATIENT: ICD-10-CM

## 2021-03-12 DIAGNOSIS — R20.2 NUMBNESS AND TINGLING: ICD-10-CM

## 2021-03-12 DIAGNOSIS — R20.0 NUMBNESS AND TINGLING: ICD-10-CM

## 2021-03-12 DIAGNOSIS — M25.50 ARTHRALGIA, UNSPECIFIED JOINT: ICD-10-CM

## 2021-03-12 DIAGNOSIS — R06.09 DYSPNEA ON EXERTION: ICD-10-CM

## 2021-03-12 DIAGNOSIS — Z11.4 SCREENING FOR HIV (HUMAN IMMUNODEFICIENCY VIRUS): ICD-10-CM

## 2021-03-12 LAB
25(OH)D3+25(OH)D2 SERPL-MCNC: 36 NG/ML (ref 30–96)
ALBUMIN SERPL BCP-MCNC: 3.9 G/DL (ref 3.5–5.2)
ALP SERPL-CCNC: 56 U/L (ref 55–135)
ALT SERPL W/O P-5'-P-CCNC: 17 U/L (ref 10–44)
ANION GAP SERPL CALC-SCNC: 8 MMOL/L (ref 8–16)
AST SERPL-CCNC: 15 U/L (ref 10–40)
BASOPHILS # BLD AUTO: 0.02 K/UL (ref 0–0.2)
BASOPHILS NFR BLD: 0.3 % (ref 0–1.9)
BILIRUB SERPL-MCNC: 1 MG/DL (ref 0.1–1)
BUN SERPL-MCNC: 16 MG/DL (ref 8–23)
CALCIUM SERPL-MCNC: 8.8 MG/DL (ref 8.7–10.5)
CHLORIDE SERPL-SCNC: 106 MMOL/L (ref 95–110)
CHOLEST SERPL-MCNC: 191 MG/DL (ref 120–199)
CHOLEST/HDLC SERPL: 4 {RATIO} (ref 2–5)
CO2 SERPL-SCNC: 27 MMOL/L (ref 23–29)
CREAT SERPL-MCNC: 0.7 MG/DL (ref 0.5–1.4)
CRP SERPL-MCNC: 0.1 MG/DL (ref 0–0.75)
DIFFERENTIAL METHOD: NORMAL
EOSINOPHIL # BLD AUTO: 0.2 K/UL (ref 0–0.5)
EOSINOPHIL NFR BLD: 3.8 % (ref 0–8)
ERYTHROCYTE [DISTWIDTH] IN BLOOD BY AUTOMATED COUNT: 13.1 % (ref 11.5–14.5)
ERYTHROCYTE [SEDIMENTATION RATE] IN BLOOD BY WESTERGREN METHOD: 2 MM/HR (ref 0–20)
EST. GFR  (AFRICAN AMERICAN): >60 ML/MIN/1.73 M^2
EST. GFR  (NON AFRICAN AMERICAN): >60 ML/MIN/1.73 M^2
ESTIMATED AVG GLUCOSE: 114 MG/DL (ref 68–131)
GLUCOSE SERPL-MCNC: 107 MG/DL (ref 70–110)
HBA1C MFR BLD: 5.6 % (ref 4.5–6.2)
HCT VFR BLD AUTO: 40.6 % (ref 37–48.5)
HDLC SERPL-MCNC: 48 MG/DL (ref 40–75)
HDLC SERPL: 25.1 % (ref 20–50)
HGB BLD-MCNC: 13.4 G/DL (ref 12–16)
IMM GRANULOCYTES # BLD AUTO: 0.01 K/UL (ref 0–0.04)
IMM GRANULOCYTES NFR BLD AUTO: 0.2 % (ref 0–0.5)
LDLC SERPL CALC-MCNC: 119 MG/DL (ref 63–159)
LYMPHOCYTES # BLD AUTO: 1.8 K/UL (ref 1–4.8)
LYMPHOCYTES NFR BLD: 30 % (ref 18–48)
MCH RBC QN AUTO: 29.2 PG (ref 27–31)
MCHC RBC AUTO-ENTMCNC: 33 G/DL (ref 32–36)
MCV RBC AUTO: 89 FL (ref 82–98)
MONOCYTES # BLD AUTO: 0.5 K/UL (ref 0.3–1)
MONOCYTES NFR BLD: 7.5 % (ref 4–15)
NEUTROPHILS # BLD AUTO: 3.5 K/UL (ref 1.8–7.7)
NEUTROPHILS NFR BLD: 58.2 % (ref 38–73)
NONHDLC SERPL-MCNC: 143 MG/DL
NRBC BLD-RTO: 0 /100 WBC
PLATELET # BLD AUTO: 199 K/UL (ref 150–350)
PMV BLD AUTO: 11 FL (ref 9.2–12.9)
POTASSIUM SERPL-SCNC: 4.4 MMOL/L (ref 3.5–5.1)
PROT SERPL-MCNC: 6 G/DL (ref 6–8.4)
RBC # BLD AUTO: 4.59 M/UL (ref 4–5.4)
SODIUM SERPL-SCNC: 141 MMOL/L (ref 136–145)
T4 FREE SERPL-MCNC: 0.65 NG/DL (ref 0.71–1.51)
TRIGL SERPL-MCNC: 120 MG/DL (ref 30–150)
TSH SERPL DL<=0.005 MIU/L-ACNC: 3.82 UIU/ML (ref 0.34–5.6)
VIT B12 SERPL-MCNC: 542 PG/ML (ref 210–950)
WBC # BLD AUTO: 6.03 K/UL (ref 3.9–12.7)

## 2021-03-12 PROCEDURE — 85025 COMPLETE CBC W/AUTO DIFF WBC: CPT | Performed by: FAMILY MEDICINE

## 2021-03-12 PROCEDURE — 80053 COMPREHEN METABOLIC PANEL: CPT | Performed by: FAMILY MEDICINE

## 2021-03-12 PROCEDURE — 86140 C-REACTIVE PROTEIN: CPT | Performed by: FAMILY MEDICINE

## 2021-03-12 PROCEDURE — 82607 VITAMIN B-12: CPT | Performed by: FAMILY MEDICINE

## 2021-03-12 PROCEDURE — 82306 VITAMIN D 25 HYDROXY: CPT | Performed by: FAMILY MEDICINE

## 2021-03-12 PROCEDURE — 85651 RBC SED RATE NONAUTOMATED: CPT | Performed by: FAMILY MEDICINE

## 2021-03-12 PROCEDURE — 80061 LIPID PANEL: CPT | Performed by: FAMILY MEDICINE

## 2021-03-12 PROCEDURE — 84439 ASSAY OF FREE THYROXINE: CPT | Performed by: FAMILY MEDICINE

## 2021-03-12 PROCEDURE — 86038 ANTINUCLEAR ANTIBODIES: CPT | Performed by: FAMILY MEDICINE

## 2021-03-12 PROCEDURE — 36415 COLL VENOUS BLD VENIPUNCTURE: CPT | Performed by: FAMILY MEDICINE

## 2021-03-12 PROCEDURE — 84443 ASSAY THYROID STIM HORMONE: CPT | Performed by: FAMILY MEDICINE

## 2021-03-12 PROCEDURE — 83036 HEMOGLOBIN GLYCOSYLATED A1C: CPT | Performed by: FAMILY MEDICINE

## 2021-03-12 PROCEDURE — 86803 HEPATITIS C AB TEST: CPT | Performed by: FAMILY MEDICINE

## 2021-03-12 PROCEDURE — 86703 HIV-1/HIV-2 1 RESULT ANTBDY: CPT | Performed by: FAMILY MEDICINE

## 2021-03-15 LAB
HCV AB SERPL QL IA: NEGATIVE
HIV 1+2 AB+HIV1 P24 AG SERPL QL IA: NEGATIVE

## 2021-03-16 LAB — ANA SER QL IF: NORMAL

## 2021-03-17 ENCOUNTER — PATIENT MESSAGE (OUTPATIENT)
Dept: FAMILY MEDICINE | Facility: CLINIC | Age: 61
End: 2021-03-17

## 2021-03-18 ENCOUNTER — HOSPITAL ENCOUNTER (OUTPATIENT)
Dept: CARDIOLOGY | Facility: HOSPITAL | Age: 61
Discharge: HOME | End: 2021-03-18
Attending: FAMILY MEDICINE
Payer: COMMERCIAL

## 2021-03-18 VITALS — BODY MASS INDEX: 29.57 KG/M2 | WEIGHT: 184 LBS | HEIGHT: 66 IN

## 2021-03-18 DIAGNOSIS — R06.09 DYSPNEA ON EXERTION: ICD-10-CM

## 2021-03-18 LAB
AORTIC ROOT ANNULUS: 2.21 CM
AORTIC VALVE CUSP SEPERATION: 1.65 CM
AV INDEX (PROSTH): 0.64
AV MEAN GRADIENT: 4 MMHG
AV PEAK GRADIENT: 8 MMHG
AV VALVE AREA: 1.63 CM2
AV VELOCITY RATIO: 0.77
BSA FOR ECHO PROCEDURE: 1.97 M2
CV ECHO LV RWT: 0.44 CM
DOP CALC AO PEAK VEL: 1.38 M/S
DOP CALC AO VTI: 32.75 CM
DOP CALC LVOT AREA: 2.5 CM2
DOP CALC LVOT DIAMETER: 1.8 CM
DOP CALC LVOT PEAK VEL: 1.06 M/S
DOP CALC LVOT STROKE VOLUME: 53.49 CM3
DOP CALCLVOT PEAK VEL VTI: 21.03 CM
E WAVE DECELERATION TIME: 211.5 MSEC
E/A RATIO: 1.13
E/E' RATIO: 6.58 M/S
ECHO LV POSTERIOR WALL: 0.81 CM (ref 0.6–1.1)
FRACTIONAL SHORTENING: 30 % (ref 28–44)
INTERVENTRICULAR SEPTUM: 0.81 CM (ref 0.6–1.1)
IVRT: 60.89 MSEC
LA MAJOR: 4.24 CM
LA MINOR: 3.56 CM
LEFT ATRIUM SIZE: 3.8 CM
LEFT ATRIUM VOLUME INDEX MOD: 5.8 ML/M2
LEFT ATRIUM VOLUME MOD: 11.14 CM3
LEFT INTERNAL DIMENSION IN SYSTOLE: 2.56 CM (ref 2.1–4)
LEFT VENTRICLE DIASTOLIC VOLUME INDEX: 29.18 ML/M2
LEFT VENTRICLE DIASTOLIC VOLUME: 56.31 ML
LEFT VENTRICLE MASS INDEX: 42 G/M2
LEFT VENTRICLE SYSTOLIC VOLUME INDEX: 12.3 ML/M2
LEFT VENTRICLE SYSTOLIC VOLUME: 23.75 ML
LEFT VENTRICULAR INTERNAL DIMENSION IN DIASTOLE: 3.65 CM (ref 3.5–6)
LEFT VENTRICULAR MASS: 81.92 G
LV LATERAL E/E' RATIO: 6.08 M/S
LV SEPTAL E/E' RATIO: 7.18 M/S
MV A" WAVE DURATION": 14.84 MSEC
MV MEAN GRADIENT: 1 MMHG
MV PEAK A VEL: 0.7 M/S
MV PEAK E VEL: 0.79 M/S
MV PEAK GRADIENT: 4 MMHG
MV STENOSIS PRESSURE HALF TIME: 39.79 MS
MV VALVE AREA P 1/2 METHOD: 5.53 CM2
PISA MRMAX VEL: 0.02 M/S
PISA TR MAX VEL: 1.49 M/S
PV MEAN GRADIENT: 2 MMHG
PV PEAK VELOCITY: 0.89 CM/S
RA MAJOR: 4.06 CM
RA PRESSURE: 3 MMHG
RA WIDTH: 3.2 CM
RIGHT VENTRICULAR END-DIASTOLIC DIMENSION: 2.91 CM
TDI LATERAL: 0.13 M/S
TDI SEPTAL: 0.11 M/S
TDI: 0.12 M/S
TR MAX PG: 9 MMHG
TRICUSPID ANNULAR PLANE SYSTOLIC EXCURSION: 1.78 CM
TV REST PULMONARY ARTERY PRESSURE: 12 MMHG

## 2021-03-18 PROCEDURE — 93306 ECHO (CUPID ONLY): ICD-10-PCS | Mod: 26,,, | Performed by: INTERNAL MEDICINE

## 2021-03-18 PROCEDURE — 93306 TTE W/DOPPLER COMPLETE: CPT | Mod: 26,,, | Performed by: INTERNAL MEDICINE

## 2021-03-18 PROCEDURE — 93356 MYOCRD STRAIN IMG SPCKL TRCK: CPT | Mod: ,,, | Performed by: INTERNAL MEDICINE

## 2021-03-18 PROCEDURE — 93306 TTE W/DOPPLER COMPLETE: CPT

## 2021-03-18 PROCEDURE — 93356 ECHO (CUPID ONLY): ICD-10-PCS | Mod: ,,, | Performed by: INTERNAL MEDICINE

## 2021-03-26 ENCOUNTER — HOSPITAL ENCOUNTER (OUTPATIENT)
Dept: RADIOLOGY | Facility: HOSPITAL | Age: 61
Discharge: HOME | End: 2021-03-26
Attending: FAMILY MEDICINE
Payer: COMMERCIAL

## 2021-03-26 DIAGNOSIS — R42 DIZZINESS: ICD-10-CM

## 2021-03-26 DIAGNOSIS — R06.09 DYSPNEA ON EXERTION: ICD-10-CM

## 2021-03-26 PROCEDURE — 93880 EXTRACRANIAL BILAT STUDY: CPT | Mod: TC

## 2021-03-26 PROCEDURE — 93880 EXTRACRANIAL BILAT STUDY: CPT | Mod: 26,,, | Performed by: RADIOLOGY

## 2021-03-26 PROCEDURE — 93880 US CAROTID BILATERAL: ICD-10-PCS | Mod: 26,,, | Performed by: RADIOLOGY

## 2021-04-01 RX ORDER — ESTRADIOL 0.5 MG/1
0.5 TABLET ORAL DAILY
Qty: 30 TABLET | Refills: 11 | Status: CANCELLED | OUTPATIENT
Start: 2021-04-01 | End: 2022-04-01

## 2021-04-05 DIAGNOSIS — N95.1 MENOPAUSAL SYMPTOM: ICD-10-CM

## 2021-04-06 RX ORDER — ESTRADIOL 0.5 MG/1
0.5 TABLET ORAL DAILY
Qty: 90 TABLET | Refills: 0 | Status: SHIPPED | OUTPATIENT
Start: 2021-04-06 | End: 2021-08-16

## 2021-04-23 ENCOUNTER — PATIENT MESSAGE (OUTPATIENT)
Dept: FAMILY MEDICINE | Facility: CLINIC | Age: 61
End: 2021-04-23

## 2021-04-26 ENCOUNTER — OFFICE VISIT (OUTPATIENT)
Dept: FAMILY MEDICINE | Facility: CLINIC | Age: 61
End: 2021-04-26
Payer: COMMERCIAL

## 2021-04-26 VITALS
DIASTOLIC BLOOD PRESSURE: 74 MMHG | HEART RATE: 66 BPM | WEIGHT: 183.19 LBS | BODY MASS INDEX: 29.44 KG/M2 | TEMPERATURE: 98 F | OXYGEN SATURATION: 97 % | HEIGHT: 66 IN | SYSTOLIC BLOOD PRESSURE: 120 MMHG | RESPIRATION RATE: 14 BRPM

## 2021-04-26 DIAGNOSIS — Z12.2 ENCOUNTER FOR SCREENING FOR MALIGNANT NEOPLASM OF RESPIRATORY ORGANS: ICD-10-CM

## 2021-04-26 DIAGNOSIS — R06.09 DYSPNEA ON EXERTION: Primary | ICD-10-CM

## 2021-04-26 PROCEDURE — 3008F PR BODY MASS INDEX (BMI) DOCUMENTED: ICD-10-PCS | Mod: CPTII,S$GLB,, | Performed by: FAMILY MEDICINE

## 2021-04-26 PROCEDURE — 99213 OFFICE O/P EST LOW 20 MIN: CPT | Mod: S$GLB,,, | Performed by: FAMILY MEDICINE

## 2021-04-26 PROCEDURE — 1126F PR PAIN SEVERITY QUANTIFIED, NO PAIN PRESENT: ICD-10-PCS | Mod: S$GLB,,, | Performed by: FAMILY MEDICINE

## 2021-04-26 PROCEDURE — 99213 PR OFFICE/OUTPT VISIT, EST, LEVL III, 20-29 MIN: ICD-10-PCS | Mod: S$GLB,,, | Performed by: FAMILY MEDICINE

## 2021-04-26 PROCEDURE — 1126F AMNT PAIN NOTED NONE PRSNT: CPT | Mod: S$GLB,,, | Performed by: FAMILY MEDICINE

## 2021-04-26 PROCEDURE — 3008F BODY MASS INDEX DOCD: CPT | Mod: CPTII,S$GLB,, | Performed by: FAMILY MEDICINE

## 2021-04-29 ENCOUNTER — HOSPITAL ENCOUNTER (OUTPATIENT)
Dept: RADIOLOGY | Facility: HOSPITAL | Age: 61
Discharge: HOME | End: 2021-04-29
Attending: FAMILY MEDICINE
Payer: COMMERCIAL

## 2021-04-29 DIAGNOSIS — Z12.2 ENCOUNTER FOR SCREENING FOR MALIGNANT NEOPLASM OF RESPIRATORY ORGANS: ICD-10-CM

## 2021-04-29 PROCEDURE — 71271 CT CHEST LUNG SCREENING LOW DOSE: ICD-10-PCS | Mod: 26,,, | Performed by: RADIOLOGY

## 2021-04-29 PROCEDURE — 71271 CT THORAX LUNG CANCER SCR C-: CPT | Mod: 26,,, | Performed by: RADIOLOGY

## 2021-04-29 PROCEDURE — 71271 CT THORAX LUNG CANCER SCR C-: CPT | Mod: TC

## 2021-05-03 ENCOUNTER — PATIENT MESSAGE (OUTPATIENT)
Dept: FAMILY MEDICINE | Facility: CLINIC | Age: 61
End: 2021-05-03

## 2021-05-05 ENCOUNTER — HOSPITAL ENCOUNTER (OUTPATIENT)
Dept: RADIOLOGY | Facility: HOSPITAL | Age: 61
Discharge: HOME | End: 2021-05-05
Attending: FAMILY MEDICINE
Payer: COMMERCIAL

## 2021-05-05 ENCOUNTER — HOSPITAL ENCOUNTER (OUTPATIENT)
Dept: CARDIOLOGY | Facility: HOSPITAL | Age: 61
Discharge: HOME | End: 2021-05-05
Attending: FAMILY MEDICINE
Payer: COMMERCIAL

## 2021-05-05 VITALS — HEART RATE: 55 BPM | SYSTOLIC BLOOD PRESSURE: 105 MMHG | DIASTOLIC BLOOD PRESSURE: 73 MMHG

## 2021-05-05 DIAGNOSIS — R06.09 DYSPNEA ON EXERTION: ICD-10-CM

## 2021-05-05 LAB
CV PHARM DOSE: 0.4 MG
CV STRESS BASE HR: 55 BPM
DIASTOLIC BLOOD PRESSURE: 73 MMHG
EJECTION FRACTION- HIGH: 65 %
END DIASTOLIC INDEX-HIGH: 153 ML/M2
END DIASTOLIC INDEX-LOW: 93 ML/M2
END SYSTOLIC INDEX-HIGH: 71 ML/M2
END SYSTOLIC INDEX-LOW: 31 ML/M2
NUC STRESS DIASTOLIC VOLUME INDEX: 58
NUC STRESS EJECTION FRACTION: 60 %
NUC STRESS SYSTOLIC VOLUME INDEX: 23
OHS CV CPX 85 PERCENT MAX PREDICTED HEART RATE MALE: 128
OHS CV CPX MAX PREDICTED HEART RATE: 151
OHS CV CPX PATIENT IS FEMALE: 1
OHS CV CPX PATIENT IS MALE: 0
OHS CV CPX PEAK DIASTOLIC BLOOD PRESSURE: 73 MMHG
OHS CV CPX PEAK HEAR RATE: 93 BPM
OHS CV CPX PEAK RATE PRESSURE PRODUCT: 9765
OHS CV CPX PEAK SYSTOLIC BLOOD PRESSURE: 105 MMHG
OHS CV CPX PERCENT MAX PREDICTED HEART RATE ACHIEVED: 62
OHS CV CPX RATE PRESSURE PRODUCT PRESENTING: 5720
RETIRED EF AND QEF - SEE NOTES: 53 %
SYSTOLIC BLOOD PRESSURE: 104 MMHG

## 2021-05-05 PROCEDURE — 93016 CV STRESS TEST SUPVJ ONLY: CPT | Mod: ,,, | Performed by: INTERNAL MEDICINE

## 2021-05-05 PROCEDURE — 93018 CV STRESS TEST I&R ONLY: CPT | Mod: ,,, | Performed by: INTERNAL MEDICINE

## 2021-05-05 PROCEDURE — 63600175 PHARM REV CODE 636 W HCPCS: Performed by: FAMILY MEDICINE

## 2021-05-05 PROCEDURE — 78452 HT MUSCLE IMAGE SPECT MULT: CPT

## 2021-05-05 PROCEDURE — A9500 TC99M SESTAMIBI: HCPCS

## 2021-05-05 PROCEDURE — 93017 CV STRESS TEST TRACING ONLY: CPT

## 2021-05-05 PROCEDURE — 78452 HT MUSCLE IMAGE SPECT MULT: CPT | Mod: 26,,, | Performed by: INTERNAL MEDICINE

## 2021-05-05 PROCEDURE — 93018 STRESS TEST WITH MYOCARDIAL PERFUSION (CUPID ONLY): ICD-10-PCS | Mod: ,,, | Performed by: INTERNAL MEDICINE

## 2021-05-05 PROCEDURE — 78452 STRESS TEST WITH MYOCARDIAL PERFUSION (CUPID ONLY): ICD-10-PCS | Mod: 26,,, | Performed by: INTERNAL MEDICINE

## 2021-05-05 PROCEDURE — 93016 STRESS TEST WITH MYOCARDIAL PERFUSION (CUPID ONLY): ICD-10-PCS | Mod: ,,, | Performed by: INTERNAL MEDICINE

## 2021-05-05 RX ORDER — REGADENOSON 0.08 MG/ML
0.4 INJECTION, SOLUTION INTRAVENOUS ONCE
Status: COMPLETED | OUTPATIENT
Start: 2021-05-05 | End: 2021-05-05

## 2021-05-05 RX ADMIN — REGADENOSON 0.4 MG: 0.08 INJECTION, SOLUTION INTRAVENOUS at 09:05

## 2021-05-08 DIAGNOSIS — R06.09 DYSPNEA ON EXERTION: Primary | ICD-10-CM

## 2021-05-16 ENCOUNTER — PATIENT OUTREACH (OUTPATIENT)
Dept: ADMINISTRATIVE | Facility: OTHER | Age: 61
End: 2021-05-16

## 2021-05-17 ENCOUNTER — OFFICE VISIT (OUTPATIENT)
Dept: CARDIOLOGY | Facility: CLINIC | Age: 61
End: 2021-05-17
Payer: COMMERCIAL

## 2021-05-17 VITALS
BODY MASS INDEX: 29.86 KG/M2 | OXYGEN SATURATION: 96 % | HEART RATE: 73 BPM | RESPIRATION RATE: 18 BRPM | DIASTOLIC BLOOD PRESSURE: 75 MMHG | HEIGHT: 66 IN | WEIGHT: 185.81 LBS | SYSTOLIC BLOOD PRESSURE: 124 MMHG

## 2021-05-17 DIAGNOSIS — I10 ESSENTIAL HYPERTENSION: Chronic | ICD-10-CM

## 2021-05-17 DIAGNOSIS — E78.5 HYPERLIPIDEMIA, UNSPECIFIED HYPERLIPIDEMIA TYPE: ICD-10-CM

## 2021-05-17 DIAGNOSIS — R94.31 ABNORMAL ECG: ICD-10-CM

## 2021-05-17 DIAGNOSIS — R07.89 ATYPICAL CHEST PAIN: ICD-10-CM

## 2021-05-17 DIAGNOSIS — R94.39 ABNORMAL CARDIOVASCULAR STRESS TEST: Primary | ICD-10-CM

## 2021-05-17 DIAGNOSIS — Z91.89 CARDIOVASCULAR EVENT RISK: ICD-10-CM

## 2021-05-17 DIAGNOSIS — Z87.891 HISTORY OF SMOKING GREATER THAN 50 PACK YEARS: ICD-10-CM

## 2021-05-17 DIAGNOSIS — J43.8 OTHER EMPHYSEMA: Chronic | ICD-10-CM

## 2021-05-17 DIAGNOSIS — G47.19 EXCESSIVE DAYTIME SLEEPINESS: ICD-10-CM

## 2021-05-17 DIAGNOSIS — R06.09 DYSPNEA ON EXERTION: ICD-10-CM

## 2021-05-17 DIAGNOSIS — Z82.49 FAMILY HISTORY OF PREMATURE CAD: ICD-10-CM

## 2021-05-17 PROCEDURE — 1125F AMNT PAIN NOTED PAIN PRSNT: CPT | Mod: S$GLB,,, | Performed by: INTERNAL MEDICINE

## 2021-05-17 PROCEDURE — 1125F PR PAIN SEVERITY QUANTIFIED, PAIN PRESENT: ICD-10-PCS | Mod: S$GLB,,, | Performed by: INTERNAL MEDICINE

## 2021-05-17 PROCEDURE — 99999 PR PBB SHADOW E&M-EST. PATIENT-LVL V: CPT | Mod: PBBFAC,,, | Performed by: INTERNAL MEDICINE

## 2021-05-17 PROCEDURE — 99999 PR PBB SHADOW E&M-EST. PATIENT-LVL V: ICD-10-PCS | Mod: PBBFAC,,, | Performed by: INTERNAL MEDICINE

## 2021-05-17 PROCEDURE — 3008F BODY MASS INDEX DOCD: CPT | Mod: CPTII,S$GLB,, | Performed by: INTERNAL MEDICINE

## 2021-05-17 PROCEDURE — 93010 ELECTROCARDIOGRAM REPORT: CPT | Mod: S$GLB,,, | Performed by: INTERNAL MEDICINE

## 2021-05-17 PROCEDURE — 93010 EKG 12-LEAD: ICD-10-PCS | Mod: S$GLB,,, | Performed by: INTERNAL MEDICINE

## 2021-05-17 PROCEDURE — 3008F PR BODY MASS INDEX (BMI) DOCUMENTED: ICD-10-PCS | Mod: CPTII,S$GLB,, | Performed by: INTERNAL MEDICINE

## 2021-05-17 PROCEDURE — 99214 OFFICE O/P EST MOD 30 MIN: CPT | Mod: 25,S$GLB,, | Performed by: INTERNAL MEDICINE

## 2021-05-17 PROCEDURE — 99214 PR OFFICE/OUTPT VISIT, EST, LEVL IV, 30-39 MIN: ICD-10-PCS | Mod: 25,S$GLB,, | Performed by: INTERNAL MEDICINE

## 2021-05-17 RX ORDER — ROSUVASTATIN CALCIUM 40 MG/1
40 TABLET, COATED ORAL DAILY
Qty: 90 TABLET | Refills: 3 | Status: SHIPPED | OUTPATIENT
Start: 2021-05-17 | End: 2023-05-16 | Stop reason: SDUPTHER

## 2021-05-24 ENCOUNTER — PATIENT MESSAGE (OUTPATIENT)
Dept: CARDIOLOGY | Facility: CLINIC | Age: 61
End: 2021-05-24

## 2021-06-02 ENCOUNTER — PATIENT MESSAGE (OUTPATIENT)
Dept: CARDIOLOGY | Facility: CLINIC | Age: 61
End: 2021-06-02

## 2021-06-09 ENCOUNTER — PATIENT MESSAGE (OUTPATIENT)
Dept: FAMILY MEDICINE | Facility: CLINIC | Age: 61
End: 2021-06-09

## 2021-06-09 DIAGNOSIS — R23.2 HOT FLASHES: Primary | ICD-10-CM

## 2021-06-09 RX ORDER — GABAPENTIN 100 MG/1
100 CAPSULE ORAL NIGHTLY
Qty: 30 CAPSULE | Refills: 11 | Status: SHIPPED | OUTPATIENT
Start: 2021-06-09 | End: 2023-03-08

## 2021-06-10 ENCOUNTER — PATIENT MESSAGE (OUTPATIENT)
Dept: FAMILY MEDICINE | Facility: CLINIC | Age: 61
End: 2021-06-10

## 2021-09-23 DIAGNOSIS — Z12.31 OTHER SCREENING MAMMOGRAM: ICD-10-CM

## 2022-01-10 ENCOUNTER — PATIENT MESSAGE (OUTPATIENT)
Dept: ADMINISTRATIVE | Facility: HOSPITAL | Age: 62
End: 2022-01-10
Payer: COMMERCIAL

## 2022-03-16 DIAGNOSIS — I10 ESSENTIAL HYPERTENSION: ICD-10-CM

## 2022-04-04 ENCOUNTER — PATIENT MESSAGE (OUTPATIENT)
Dept: ADMINISTRATIVE | Facility: HOSPITAL | Age: 62
End: 2022-04-04
Payer: COMMERCIAL

## 2022-04-27 ENCOUNTER — PATIENT MESSAGE (OUTPATIENT)
Dept: CARDIOLOGY | Facility: CLINIC | Age: 62
End: 2022-04-27
Payer: COMMERCIAL

## 2022-05-31 ENCOUNTER — PATIENT MESSAGE (OUTPATIENT)
Dept: ADMINISTRATIVE | Facility: HOSPITAL | Age: 62
End: 2022-05-31
Payer: COMMERCIAL

## 2022-08-24 ENCOUNTER — PATIENT MESSAGE (OUTPATIENT)
Dept: ADMINISTRATIVE | Facility: HOSPITAL | Age: 62
End: 2022-08-24
Payer: COMMERCIAL

## 2022-10-11 ENCOUNTER — PATIENT MESSAGE (OUTPATIENT)
Dept: ADMINISTRATIVE | Facility: HOSPITAL | Age: 62
End: 2022-10-11
Payer: COMMERCIAL

## 2022-10-18 ENCOUNTER — PATIENT OUTREACH (OUTPATIENT)
Dept: ADMINISTRATIVE | Facility: HOSPITAL | Age: 62
End: 2022-10-18
Payer: COMMERCIAL

## 2022-10-18 NOTE — PROGRESS NOTES
Population Health Outreach.  Spoke with pt about overdue Annual PCP visit & Mammogram screening. Pt stated that she moved and is no longer using Dr Vega for her PCP needs.

## 2022-11-28 LAB — BCS RECOMMENDATION EXT: NORMAL

## 2023-02-20 ENCOUNTER — TELEPHONE (OUTPATIENT)
Dept: FAMILY MEDICINE | Facility: CLINIC | Age: 63
End: 2023-02-20
Payer: COMMERCIAL

## 2023-02-20 NOTE — TELEPHONE ENCOUNTER
----- Message from Ayaka Garcia sent at 2/20/2023 10:16 AM CST -----  Contact: pt  Type:  Schedule Appointment Request    Caller is requesting a sooner appointment.  Caller declined first available appointment listed below.  Caller will not accept being placed on the waitlist and is requesting a message be sent to doctor.  Name of Caller:Pt  When is the first available appointment???  Symptoms:Est care  Would the patient rather a call back or a response via [facility]?  Call    Best Call Back Number:[phone]   Additional Information: States  see NP And would like to est care. Please advise thank you.

## 2023-02-20 NOTE — TELEPHONE ENCOUNTER
Spoke with patient. Appointment scheduled to est care with Lizeth WILKES. Pt voiced understanding.

## 2023-03-08 ENCOUNTER — PATIENT OUTREACH (OUTPATIENT)
Dept: ADMINISTRATIVE | Facility: HOSPITAL | Age: 63
End: 2023-03-08
Payer: MEDICARE

## 2023-03-08 ENCOUNTER — OFFICE VISIT (OUTPATIENT)
Dept: FAMILY MEDICINE | Facility: CLINIC | Age: 63
End: 2023-03-08
Payer: MEDICARE

## 2023-03-08 VITALS
OXYGEN SATURATION: 95 % | RESPIRATION RATE: 18 BRPM | HEART RATE: 63 BPM | DIASTOLIC BLOOD PRESSURE: 84 MMHG | WEIGHT: 181 LBS | SYSTOLIC BLOOD PRESSURE: 122 MMHG | HEIGHT: 66 IN | BODY MASS INDEX: 29.09 KG/M2

## 2023-03-08 DIAGNOSIS — Z13.820 ENCOUNTER FOR OSTEOPOROSIS SCREENING IN ASYMPTOMATIC POSTMENOPAUSAL PATIENT: ICD-10-CM

## 2023-03-08 DIAGNOSIS — Z78.0 ENCOUNTER FOR OSTEOPOROSIS SCREENING IN ASYMPTOMATIC POSTMENOPAUSAL PATIENT: ICD-10-CM

## 2023-03-08 DIAGNOSIS — Z76.89 ENCOUNTER TO ESTABLISH CARE: Primary | ICD-10-CM

## 2023-03-08 DIAGNOSIS — F33.1 MODERATE EPISODE OF RECURRENT MAJOR DEPRESSIVE DISORDER: ICD-10-CM

## 2023-03-08 DIAGNOSIS — K02.9 DENTAL CARIES: ICD-10-CM

## 2023-03-08 DIAGNOSIS — J43.8 OTHER EMPHYSEMA: ICD-10-CM

## 2023-03-08 PROCEDURE — 99214 OFFICE O/P EST MOD 30 MIN: CPT | Mod: S$PBB,,, | Performed by: NURSE PRACTITIONER

## 2023-03-08 PROCEDURE — 99214 PR OFFICE/OUTPT VISIT, EST, LEVL IV, 30-39 MIN: ICD-10-PCS | Mod: S$PBB,,, | Performed by: NURSE PRACTITIONER

## 2023-03-08 PROCEDURE — 99999 PR PBB SHADOW E&M-EST. PATIENT-LVL IV: ICD-10-PCS | Mod: PBBFAC,,, | Performed by: NURSE PRACTITIONER

## 2023-03-08 PROCEDURE — 99214 OFFICE O/P EST MOD 30 MIN: CPT | Mod: PBBFAC | Performed by: NURSE PRACTITIONER

## 2023-03-08 PROCEDURE — 99999 PR PBB SHADOW E&M-EST. PATIENT-LVL IV: CPT | Mod: PBBFAC,,, | Performed by: NURSE PRACTITIONER

## 2023-03-08 RX ORDER — AMOXICILLIN 500 MG/1
TABLET, FILM COATED ORAL
Qty: 28 TABLET | Refills: 0 | Status: SHIPPED | OUTPATIENT
Start: 2023-03-08 | End: 2023-04-12 | Stop reason: ALTCHOICE

## 2023-03-08 RX ORDER — CHOLECALCIFEROL (VITAMIN D3) 25 MCG
2000 TABLET ORAL
COMMUNITY
Start: 2022-07-15 | End: 2023-03-08

## 2023-03-08 RX ORDER — FAMOTIDINE 40 MG/1
40 TABLET, FILM COATED ORAL NIGHTLY
COMMUNITY

## 2023-03-08 RX ORDER — ZINC GLUCONATE 50 MG
50 TABLET ORAL
COMMUNITY
End: 2023-03-08

## 2023-03-08 RX ORDER — ZINC GLUCONATE 50 MG
50 TABLET ORAL DAILY
Start: 2023-03-08 | End: 2023-09-12

## 2023-03-08 RX ORDER — IBUPROFEN 100 MG/5ML
1000 SUSPENSION, ORAL (FINAL DOSE FORM) ORAL
COMMUNITY
Start: 2022-09-16

## 2023-03-08 RX ORDER — FAMOTIDINE, CALCIUM CARBONATE, AND MAGNESIUM HYDROXIDE 10; 800; 165 MG/1; MG/1; MG/1
1 TABLET, CHEWABLE ORAL NIGHTLY
COMMUNITY
End: 2023-03-08

## 2023-03-08 RX ORDER — ALBUTEROL SULFATE 90 UG/1
2 AEROSOL, METERED RESPIRATORY (INHALATION) EVERY 6 HOURS PRN
COMMUNITY
End: 2023-04-19 | Stop reason: SDUPTHER

## 2023-03-08 NOTE — PROGRESS NOTES
Subjective:       Patient ID: Josseline Degroot is a 63 y.o. female.    Chief Complaint: Establish Care  -  The patient is a 64 y/o female that presents to [facility]. She was living in [place] moved to [place] then to [facility] was under the care of Ellen Okeefe NP at [facility] in [place].     When she lived here a couple years ago was under the care of Dr. Molina for fatigue, Sob, CP at rest which she still has, approx 1-2 xs a month. Appears angiogram recommended yet fearful of procedure d/t mother  during angiogram thus tried to educate.     All labs 2022 viewed and discussed as normal.     22 normal mammo in care everywhere.    HCC:  Depression: reports controlled on current regimen yet will monitor  Emphysema: chronic SOB, quit smoking 10 yrs ago, use of treligy does not help as much as it used to.  -      Past Medical History:   Diagnosis Date    Colon polyp     COPD (chronic obstructive pulmonary disease)     Depression     GERD (gastroesophageal reflux disease)     Hyperlipidemia     Hypertension     Rheumatic fever in pediatric patient     as a child        Past Surgical History:   Procedure Laterality Date    BREAST BIOPSY Left     COLONOSCOPY      COLONOSCOPY N/A 2020    Procedure: COLONOSCOPY;  Surgeon: Hao Roger MD;  Location: [facility] ENDO;  Service: Endoscopy;  Laterality: N/A;    ESOPHAGOGASTRODUODENOSCOPY N/A 2020    Procedure: EGD (ESOPHAGOGASTRODUODENOSCOPY);  Surgeon: Hao Roger MD;  Location: [facility];  Service: Endoscopy;  Laterality: N/A;    HYSTERECTOMY      TOTAL    INJECTION OF ANESTHETIC AGENT AROUND MEDIAL BRANCH NERVES INNERVATING LUMBAR FACET JOINT Right 2020    Procedure: Block-nerve-medial branch-lumbar;  Surgeon: Sergey Perez MD;  Location: [facility] OR;  Service: Pain Management;  Laterality: Right;  L3,4,5    OOPHORECTOMY      RADIOFREQUENCY ABLATION OF LUMBAR MEDIAL BRANCH NERVE AT SINGLE LEVEL Right 2020    Procedure: Radiofrequency Ablation,  Nerve, Spinal, Lumbar, Medial Branch, 1 Level;  Surgeon: Sergey Perez MD;  Location: [facility] OR;  Service: Pain Management;  Laterality: Right;  L3,4,5        Social History     Socioeconomic History    Marital status:    Tobacco Use    Smoking status: Former     Types: Cigarettes     Quit date: 10/1/2017     Years since quittin.4    Smokeless tobacco: Never   Substance and Sexual Activity    Alcohol use: No    Drug use: Not Currently    Sexual activity: Yes     Partners: Male       Family History   Problem Relation Age of Onset    Heart disease Mother     Heart disease Father     Cancer Father     Heart disease Brother     Breast cancer Paternal Aunt     Breast cancer Paternal Grandmother        Review of patient's allergies indicates:   Allergen Reactions    Amitriptyline Other (See Comments)     Dizzy, lightheaded, photophobia, sleepy, irritable  Dizzy, lightheaded, photophobia, sleepy, irritable            Current Outpatient Medications:     albuterol (PROVENTIL/VENTOLIN HFA) 90 mcg/actuation inhaler, Inhale 2 puffs into the lungs every 6 (six) hours as needed. Rescue, Disp: , Rfl:     ascorbic acid, vitamin C, (VITAMIN C) 1000 MG tablet, Take 1,000 mg by mouth., Disp: , Rfl:     buPROPion (WELLBUTRIN XL) 150 MG TB24 tablet, TAKE 1 TABLET DAILY, Disp: 90 tablet, Rfl: 3    estradioL (ESTRACE) 0.5 MG tablet, TAKE 1 TABLET DAILY, Disp: 90 tablet, Rfl: 3    famotidine (PEPCID) 40 MG tablet, Take 40 mg by mouth nightly., Disp: , Rfl:     fluticasone-umeclidin-vilanter (TRELEGY ELLIPTA) 100-62.5-25 mcg DsDv, Inhale 1 puff into the lungs once daily., Disp: 60 each, Rfl: 5    lisinopriL (PRINIVIL,ZESTRIL) 20 MG tablet, TAKE 1 TABLET DAILY, Disp: 90 tablet, Rfl: 3    amoxicillin (AMOXIL) 500 MG Tab, 2 gm 1 hr prior to dental procedure, Disp: 28 tablet, Rfl: 0    rosuvastatin (CRESTOR) 40 MG Tab, Take 1 tablet (40 mg total) by mouth once daily., Disp: 90 tablet, Rfl: 3    zinc gluconate 50 mg tablet, Take 1 tablet  (50 mg total) by mouth once daily., Disp: , Rfl:   No current facility-administered medications for this visit.    HPI  Review of Systems   Constitutional: Negative.    HENT:  Positive for dental problem.    Eyes: Negative.    Respiratory: Negative.     Cardiovascular: Negative.    Gastrointestinal: Negative.    Endocrine: Negative.    Genitourinary: Negative.    Musculoskeletal:  Positive for neck pain.   Skin: Negative.    Allergic/Immunologic: Negative.    Neurological:  Positive for headaches.   Hematological: Negative.    Psychiatric/Behavioral: Negative.       Objective:      Physical Exam  Vitals and nursing note reviewed.   Constitutional:       General: She is not in acute distress.     Appearance: Normal appearance. She is well-developed and normal weight. She is not ill-appearing.   HENT:      Head: Normocephalic.   Eyes:      Conjunctiva/sclera: Conjunctivae normal.   Neck:      Thyroid: No thyromegaly.   Cardiovascular:      Rate and Rhythm: Normal rate and regular rhythm.      Heart sounds: Normal heart sounds. No murmur heard.  Pulmonary:      Effort: Pulmonary effort is normal.      Breath sounds: Normal breath sounds. No wheezing or rales.   Musculoskeletal:         General: Normal range of motion.      Cervical back: Normal range of motion.      Right lower leg: No edema.      Left lower leg: No edema.   Skin:     General: Skin is warm and dry.   Neurological:      Mental Status: She is alert and oriented to person, place, and time. Mental status is at baseline.   Psychiatric:         Mood and Affect: Mood normal.         Behavior: Behavior normal.         Thought Content: Thought content normal.         Judgment: Judgment normal.       Assessment:       1. Encounter to establish care    2. Dental caries    3. Encounter for osteoporosis screening in asymptomatic postmenopausal patient    4. Moderate episode of recurrent major depressive disorder    5. Other emphysema          Plan:     1-  amoxicillin 2 gm 1 hr prior to dental procedure  2- Dexa ordered   3- needs f/u with  was last seen 2020  4- take aleve for neck pain/HA  5- RTC 3 mo    Encounter to establish care    Dental caries  -     amoxicillin (AMOXIL) 500 MG Tab; 2 gm 1 hr prior to dental procedure  Dispense: 28 tablet; Refill: 0    Encounter for osteoporosis screening in asymptomatic postmenopausal patient  -     DXA Bone Density Axial Skeleton 1 or more sites; Future; Expected date: 03/08/2023    Moderate episode of recurrent major depressive disorder    Other emphysema    Other orders  -     zinc gluconate 50 mg tablet; Take 1 tablet (50 mg total) by mouth once daily.        Risks, benefits, and side effects were discussed with the patient. All questions were answered to the fullest satisfaction of the patient, and pt verbalized understanding and agreement to treatment plan. Pt was to call with any new or worsening symptoms, or present to the ER.         1-

## 2023-03-08 NOTE — PROGRESS NOTES
Records Received, hyper-linked into chart at this time. The following record(s)  below were uploaded for Health Maintenance .    11/2022  MAMMOGRAM SCREENING              x2   IMMUNIZATIONS

## 2023-03-24 NOTE — OP NOTE
PROCEDURE DATE: 2/12/2020    PROCEDURE:  Radiofrequency ablation of the L3,4,5 medial branch nerves on the right-side utilizing fluoroscopy (corresponds to right L4/5 and L5/S1 facets)    DIAGNOSIS:  Other lumbar spondylosis  Post op Diagnosis: Same    PHYSICIAN: Sergey Perez MD    MEDICATIONS INJECTED:  From a mixture of 6ml of 0.25% bupivicaine and 80mg of methylprednisone,  1ml of this solution was injected at each level.    LOCAL ANESTHETIC USED: Lidocaine 1%, 1 ml given at each site.    SEDATION MEDICATIONS: RN IV sedation    ESTIMATED BLOOD LOSS:  None    COMPLICATIONS:  NOne    TECHNIQUE:  A time out was taken to identify patient and procedure side prior to starting the procedure. Laying in a prone position, the patient was prepped and draped in the usual sterile fashion using ChloraPrep and sterile towels.  The levels were determined under fluoroscopic guidance and then marked.  Local anesthetic was given by raising a wheal at the skin over each site and then infiltrated approximately 2cm deeper.  A 20-gauge  100 mm RF needle was introduced to the anatomic location of the right L3,4,5 medial branch nerves. Motor stimulation up to 2 Volts at each level confirmed no motor nerve involvement.  Impedance was less than 800 ohms at each level.  1ml of 2% lidocaine was instilled prior to lesioning.  Ablation was performed per level utilizing radiofrequency generator 80°C for 60 seconds.  Needles were then rotated 90° and a second lesion was performed.  The above noted medication was then injected slowly. The patient tolerated the procedure well.     The patient was monitored after the procedure.  Patient was given post procedure and discharge instructions to follow at home.  The patient was discharged in a stable condition     Patient's spouse is calling again wanting to add that when patient coughs he gets a pain in his incision site.

## 2023-03-27 ENCOUNTER — PATIENT MESSAGE (OUTPATIENT)
Dept: CARDIOLOGY | Facility: CLINIC | Age: 63
End: 2023-03-27
Payer: MEDICARE

## 2023-03-28 ENCOUNTER — HOSPITAL ENCOUNTER (OUTPATIENT)
Dept: RADIOLOGY | Facility: HOSPITAL | Age: 63
Discharge: HOME | End: 2023-03-28
Attending: NURSE PRACTITIONER
Payer: MEDICARE

## 2023-03-28 DIAGNOSIS — Z78.0 ENCOUNTER FOR OSTEOPOROSIS SCREENING IN ASYMPTOMATIC POSTMENOPAUSAL PATIENT: ICD-10-CM

## 2023-03-28 DIAGNOSIS — Z13.820 ENCOUNTER FOR OSTEOPOROSIS SCREENING IN ASYMPTOMATIC POSTMENOPAUSAL PATIENT: ICD-10-CM

## 2023-03-28 PROCEDURE — 77080 DXA BONE DENSITY AXIAL: CPT | Mod: 26,,, | Performed by: RADIOLOGY

## 2023-03-28 PROCEDURE — 77080 DXA BONE DENSITY AXIAL: CPT | Mod: TC

## 2023-03-28 PROCEDURE — 77080 DXA BONE DENSITY AXIAL SKELETON 1 OR MORE SITES: ICD-10-PCS | Mod: 26,,, | Performed by: RADIOLOGY

## 2023-04-07 ENCOUNTER — PATIENT MESSAGE (OUTPATIENT)
Dept: FAMILY MEDICINE | Facility: CLINIC | Age: 63
End: 2023-04-07
Payer: MEDICARE

## 2023-04-09 ENCOUNTER — PATIENT MESSAGE (OUTPATIENT)
Dept: FAMILY MEDICINE | Facility: CLINIC | Age: 63
End: 2023-04-09
Payer: MEDICARE

## 2023-04-11 ENCOUNTER — PATIENT MESSAGE (OUTPATIENT)
Dept: FAMILY MEDICINE | Facility: CLINIC | Age: 63
End: 2023-04-11
Payer: MEDICARE

## 2023-04-11 NOTE — TELEPHONE ENCOUNTER
Dear Dr. Levine,     In the absence of Guerita Padilla, JOSEPH, can you please address this patients concern or request?      Alternative medication  Thank you,  Adeline Hill LPN

## 2023-04-12 ENCOUNTER — OFFICE VISIT (OUTPATIENT)
Dept: CARDIOLOGY | Facility: CLINIC | Age: 63
End: 2023-04-12
Payer: MEDICARE

## 2023-04-12 VITALS
HEIGHT: 66 IN | DIASTOLIC BLOOD PRESSURE: 56 MMHG | HEART RATE: 79 BPM | SYSTOLIC BLOOD PRESSURE: 100 MMHG | WEIGHT: 180.69 LBS | BODY MASS INDEX: 29.04 KG/M2 | OXYGEN SATURATION: 96 %

## 2023-04-12 DIAGNOSIS — G47.19 EXCESSIVE DAYTIME SLEEPINESS: ICD-10-CM

## 2023-04-12 DIAGNOSIS — Z87.891 HISTORY OF SMOKING GREATER THAN 50 PACK YEARS: ICD-10-CM

## 2023-04-12 DIAGNOSIS — R06.83 SNORES: ICD-10-CM

## 2023-04-12 DIAGNOSIS — R53.82 CHRONIC FATIGUE: ICD-10-CM

## 2023-04-12 DIAGNOSIS — E65 ABDOMINAL OBESITY: ICD-10-CM

## 2023-04-12 DIAGNOSIS — R73.03 PREDIABETES: ICD-10-CM

## 2023-04-12 DIAGNOSIS — Z82.49 FAMILY HISTORY OF PREMATURE CAD: ICD-10-CM

## 2023-04-12 DIAGNOSIS — R07.89 ATYPICAL CHEST PAIN: Primary | ICD-10-CM

## 2023-04-12 DIAGNOSIS — Z91.89 CARDIOVASCULAR EVENT RISK: ICD-10-CM

## 2023-04-12 DIAGNOSIS — J43.8 OTHER EMPHYSEMA: Chronic | ICD-10-CM

## 2023-04-12 DIAGNOSIS — R94.39 ABNORMAL NUCLEAR STRESS TEST: ICD-10-CM

## 2023-04-12 DIAGNOSIS — R06.09 DOE (DYSPNEA ON EXERTION): ICD-10-CM

## 2023-04-12 DIAGNOSIS — I10 ESSENTIAL HYPERTENSION: Chronic | ICD-10-CM

## 2023-04-12 PROBLEM — R94.31 ABNORMAL ECG: Status: RESOLVED | Noted: 2021-05-17 | Resolved: 2023-04-12

## 2023-04-12 PROCEDURE — 93010 ELECTROCARDIOGRAM REPORT: CPT | Mod: S$PBB,,, | Performed by: INTERNAL MEDICINE

## 2023-04-12 PROCEDURE — 93010 EKG 12-LEAD: ICD-10-PCS | Mod: S$PBB,,, | Performed by: INTERNAL MEDICINE

## 2023-04-12 PROCEDURE — 99215 PR OFFICE/OUTPT VISIT, EST, LEVL V, 40-54 MIN: ICD-10-PCS | Mod: S$PBB,25,, | Performed by: INTERNAL MEDICINE

## 2023-04-12 PROCEDURE — 99999 PR PBB SHADOW E&M-EST. PATIENT-LVL III: CPT | Mod: PBBFAC,,, | Performed by: INTERNAL MEDICINE

## 2023-04-12 PROCEDURE — 99213 OFFICE O/P EST LOW 20 MIN: CPT | Mod: PBBFAC | Performed by: INTERNAL MEDICINE

## 2023-04-12 PROCEDURE — 99999 PR PBB SHADOW E&M-EST. PATIENT-LVL III: ICD-10-PCS | Mod: PBBFAC,,, | Performed by: INTERNAL MEDICINE

## 2023-04-12 PROCEDURE — 99215 OFFICE O/P EST HI 40 MIN: CPT | Mod: S$PBB,25,, | Performed by: INTERNAL MEDICINE

## 2023-04-12 PROCEDURE — 93005 ELECTROCARDIOGRAM TRACING: CPT | Mod: PBBFAC | Performed by: INTERNAL MEDICINE

## 2023-04-12 NOTE — PATIENT INSTRUCTIONS
Recommended Mediterranean dietEating Heart-Healthy Food: Using the DASH Plan  Eating for your heart doesnt have to be hard or boring. You just need to know how to make healthier choices. The DASH eating plan has been developed to help you do just that. DASH stands for Dietary Approaches to Stop Hypertension. It is a plan that has been proven to be healthier for your heart and to lower your risk for high blood pressure. It can also help lower your risk for cancer, heart disease, osteoporosis, and diabetes.  Choosing from Each Food Group  Choose foods from each of the food groups below each day. Try to get the recommended number of servings for each food group. The serving numbers are based on a diet of 2,000 calories a day. Talk to your doctor if youre unsure about your calorie needs.  Grains   Servings: 7-8 a day  A serving is:  1 slice bread  1 ounce dry cereal  half a cup cooked rice or pasta  Best choices: Whole grains and any grains high in fiber.  Vegetables   Servings: 4-5 a day  A serving is:  1 cup raw leafy vegetable  Half a cup cooked vegetable  Three-quarter cup vegetable juice  Best choices: Fresh or frozen vegetable prepared without too much added salt or fat.    Fruits   Servings: 4-5 a day  A serving is:  Three-quarter cup fruit juice  1 medium fruit  One-quarter cup dried fruit  One-half cup fresh, frozen, or canned fruit  Best choices: A variety of fresh fruits of different colors. Whole fruits are a much better choice than fruit juices.  Low-fat or Fat Free Dairy   Servings: 2-3 a day  A serving is:  8 ounces milk  1 cup yogurt  One and a half ounces cheese  Best choices: Skim or 1% milk, low-fat or fat free yogurt or buttermilk, and low-fat cheeses.       Meat, Poultry, Fish   Servings: 2 or fewer a day  A serving is:  3 ounces cooked meat, poultry, or fish  Best choices: Lean meats and fish. Trim away visible fat. Broil, roast, or boil instead of frying. Remove skin from poultry before eating.   Nuts, Seeds, Beans   Servings: 4-5 a week  A serving is:  One third cup nuts (or one and a half ounces)  2 tablespoons sunflower seeds  Half a cup cooked beans  Best choices: Dry roasted nuts with no salt added, lentils, kidney beans, garbanzo beans, and whole woodall beans.    Fats and Oils   Servings: 2 a day  A serving is:  1 teaspoon vegetable oil  1 teaspoon soft margarine  1 tablespoon low-fat mayonnaise  1 teaspoon regular mayonnaise  2 tablespoons light salad dressing  1 tablespoon regular salad dressing  Best choices: Monounsaturated and polyunsaturated fats such as olive, canola, or safflower oil.  Sweets   Servings: 5 a week or fewer  A serving is:  1 tablespoon sugar, maple syrup, or honey  1 tablespoon jam or jelly  1 half-ounce jelly beans (about 15)  8 ounces lemonade  Best choices: Dried fruit can be a satisfying sweet. Choose low-fat sweets when possible. And watch your serving sizes!       Aerobic Exercise for a Healthy Heart  Exercise is a lot more than an energy booster and a stress reliever. It also strengthens your heart muscle, lowers your blood pressure and blood cholesterol, and burns calories.      Remember, some activity is better than none.     Choose an Aerobic Activity  Choose a nonstop activity that makes your heart and lungs work harder than they do when you rest or walk normally. This aerobic exercise can improve the way your heart and other muscles use oxygen. Make it fun by exercising with a friend and choosing an activity you enjoy. Here are some ideas:  Walking  Swimming  Bicycling  Stair climbing  Dancing  Jogging  Exercise Regularly  If you havent been exercising regularly,  get your doctors okay first. Then start slowly.  Here are some tips:  Begin exercising 3 times a week for 5-10 minutes at a time.  When you feel comfortable, add a few minutes each week.  Slowly build up to exercising 3-4 times each week for 20-40 minutes. Aim for a total of 150 or more minutes a  week.  Be sure to carry your nitroglycerin with you when you exercise.  If you get angina when youre exercising, stop what youre doing, take your nitroglycerin, and call your doctor.  © 3152-7243 [employer], [address] [place] [address], [place] [address]. All rights reserved. This information is not intended as a substitute for professional medical care. Always follow your healthcare professional's instructions.    Losing Weight (Cardiovascular)  Excess weight is a major risk factor for heart disease. Losing weight may help keep your arteries open so that your heart can get the oxygen-rich blood it needs. Weight loss can also help lower your blood pressure and reduce your risk for diabetes. All in all, losing weight makes you healthier.          Exercise with a friend. When activity is fun, you're more likely to stick with it.        Calories and Weight Loss  Calories are the fuel your body burns for energy. You get the calories you need from the food you eat. For healthy weight loss, women should eat at least 1,200 calories a day, men at least 1,500.    When you eat more calories than you need, your body stores the extra calories as fat. One pound of fat equals 3,500 calories.    To lose weight, try to burn 500 calories a day more than you eat. To do this, eat 250 calories less each day. Add activity to burn the other 250 calories. Walking 21/2 miles burns about 250 calories.    Eat a variety of healthy foods. Its the best way to make calories count.     Tips for losing weight:  Drink 8 to 10 glasses of water a day.    Dont skip meals. Instead, eat smaller portions.       Brisk Activity Is Best  Brisk activity gets your heart pumping faster. It makes your heart healthier. Its also the best way to burn calories. In fact, your body may keep burning calories for hours after you stop a brisk activity.    Begin by walking 10 minutes most days.    Add more time and speed to your walk. Build up as you feel  able.    Try to walk briskly at least 30 minutes most days. If needed, you can break this into 2 shorter sessions.     Check off the ideas below that you could try to make your day more active:    Take the stairs instead of the elevator.    Park your car farther away and walk.    Ride a bike to work or to the store.    Walk laps around the mall.

## 2023-04-12 NOTE — PROGRESS NOTES
Subjective:    Patient ID:  Josseline Degroot is a 63 y.o. female who presents for evaluation of No chief complaint on file.  Also BELTRÁN and lack of energy, chest ache  PCP: Marvin Vega MD, now Guerita Padilla NP   Prior cardiologist: Dr. 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    Health literacy: medium  Vaccinations: up-to-date, completed COVID, infection fall 2022, no residual   Activities: walk in yard, no regular exercise, feels limited by CP, BELTRÁN and fatigue  Nicotine: quit 2014, started at age 12, over 75+ py  Alcohol: none  Illicit drugs: none  Cardiac symptoms: CP, BELTRÁN and fatigue  Home BP: do not check  Medication compliance: yes  Diet: regular, no added salt  Caffeine: none  Labs:   Lab Results   Component Value Date    TSH 3.26 11/04/2022        Lab Results   Component Value Date    HGBA1C 5.7 (H) 11/04/2022       Lab Results   Component Value Date    WBC 6.7 11/04/2022    HGB 14.7 11/04/2022    HCT 45.1 11/04/2022    MCV 91.7 11/04/2022     11/04/2022       CMP  Sodium   Date Value Ref Range Status   11/04/2022 142 136 - 147 mmol/L Final   03/12/2021 141 136 - 145 mmol/L Final     Potassium   Date Value Ref Range Status   11/04/2022 4.8 3.5 - 5.1 mmol/L Final   03/12/2021 4.4 3.5 - 5.1 mmol/L Final     Chloride   Date Value Ref Range Status   11/04/2022 106 98 - 107 mmol/L Final   03/12/2021 106 95 - 110 mmol/L Final     CO2   Date Value Ref Range Status   11/04/2022 29 20 - 31 mmol/L Final   03/12/2021 27 23 - 29 mmol/L Final     Glucose   Date Value Ref Range Status   03/12/2021 107 70 - 110 mg/dL Final     BUN   Date Value Ref Range Status   11/04/2022 15 9 - 23 mg/dL Final   03/12/2021 16 8 - 23 mg/dL Final     Creatinine   Date Value Ref Range Status   11/04/2022  0.72 0.55 - 1.02 mg/dL Final   03/12/2021 0.7 0.5 - 1.4 mg/dL Final     Calcium   Date Value Ref Range Status   11/04/2022 9.9 8.3 - 10.6 mg/dL Final   03/12/2021 8.8 8.7 - 10.5 mg/dL Final     Total Protein   Date Value Ref Range Status   03/12/2021 6.0 6.0 - 8.4 g/dL Final     Albumin   Date Value Ref Range Status   03/12/2021 3.9 3.5 - 5.2 g/dL Final     Albumin Level   Date Value Ref Range Status   11/04/2022 4.4 3.2 - 4.8 g/dL Final     Total Bilirubin   Date Value Ref Range Status   03/12/2021 1.0 0.1 - 1.0 mg/dL Final     Comment:     For infants and newborns, interpretation of results should be based  on gestational age, weight and in agreement with clinical  observations.    Premature Infant recommended reference ranges:  Up to 24 hours.............<8.0 mg/dL  Up to 48 hours............<12.0 mg/dL  3-5 days..................<15.0 mg/dL  6-29 days.................<15.0 mg/dL       Alkaline Phosphatase   Date Value Ref Range Status   03/12/2021 56 55 - 135 U/L Final     Alk Phos   Date Value Ref Range Status   11/04/2022 89 46 - 116 IU/L Final     AST   Date Value Ref Range Status   03/12/2021 15 10 - 40 U/L Final     Aspartate Aminotransferase   Date Value Ref Range Status   11/04/2022 32 <34 IU/L Final     ALT   Date Value Ref Range Status   03/12/2021 17 10 - 44 U/L Final     Alanine Aminotransferase   Date Value Ref Range Status   11/04/2022 26 10 - 49 IU/L Final     Anion Gap   Date Value Ref Range Status   03/12/2021 8 8 - 16 mmol/L Final     eGFR if    Date Value Ref Range Status   03/12/2021 >60.0 >60 mL/min/1.73 m^2 Final     eGFR    Date Value Ref Range Status   11/04/2022 >90 >90 mL/min/1.73m2 Final     eGFR if non    Date Value Ref Range Status   03/12/2021 >60.0 >60 mL/min/1.73 m^2 Final     Comment:     Calculation used to obtain the estimated glomerular filtration  rate (eGFR) is the CKD-EPI equation.        eGFR   Date Value Ref Range Status    2022 88 (L) >90 mL/min/1.73m2 Final     @labrcntip(troponini)@    BNP   Date Value Ref Range Status   2022 64.5 <=100.0 pg/mL Final   2020 45 0 - 99 pg/mL Final     Comment:     Values of less than 100 pg/ml are consistent with non-CHF populations.   }   Lab Results   Component Value Date    CHOL 170 2022    CHOL 191 2021    CHOL 222 (H) 2019     Lab Results   Component Value Date    HDL 49 2022    HDL 48 2021    HDL 53 2019     Lab Results   Component Value Date    LDLCALC 99 2022    LDLCALC 119.0 2021    LDLCALC 135.4 2019     Lab Results   Component Value Date    TRIG 108 2022    TRIG 120 2021    TRIG 168 (H) 2019     Lab Results   Component Value Date    CHOLHDL 25.1 2021    CHOLHDL 23.9 2019     Last Echo: 3/2021  Last stress test: Lexiscan 2021  Cardiovascular angiogram: none  ECG: NSR, rate 70, normal  Fundoscopic exam: annual, negative for retinopathy    In 2021:  WF referred for recent onset of atypical CP with BELTRÁN and fatigue. Noted about 2-3 times over the past 6 weeks, feel unable to do much due to this while she was very active with any limitations prior to this. Have significant ASCVD risk factors, see Problem List and also very strong premature family history for CAD. Mother  of MI at age 61 after 10 years of problem, father with MI in his early 50s and brother  of MI at age 61. Was able to quit smoking in  but expose to second-hand smoker from .     Lexiscan - Equivocal myocardial perfusion scan.  ·  There is a mild to moderate intensity, small sized, equivocal defect that is mostly fixed with some reversible areas in the mid to apical anteroapical wall(s) in the typical distribution of the LAD territory. This finding is equivocal due to a breast shadow overlying the myocardium.  ·  The visually estimated ejection fraction is normal at rest.  ·  There is normal wall motion at  "rest and post stress.  ·  The EKG portion of this study is negative for ischemia.  ·  The patient reported chest pain during the stress test.  ·  There were no arrhythmias during stress.    Echo - The left ventricle is normal in size with concentric remodeling and normal systolic function. The estimated ejection fraction is 58%  Normal left ventricular diastolic function.  The left ventricular global longitudinal strain is -21%. Normal.  Normal right ventricular size with normal right ventricular systolic function.  Normal central venous pressure (3 mmHg).  The estimated PA systolic pressure is 12 mmHg.  Small pericardial effusion.    Carotid US - Impression:     No evidence of a hemodynamically significant carotid bifurcation stenosis.    Marvin Vega MD noted "Continues to have some chest pain in the center of her chest with exertion. Testing thus far has been negative. Does have a 40+ pack-year smoking history. Stopped smoking about 4 years ago."    HPI comments: in 4/2023, return for check up. Never called by interventional cardiology. Continue with concern of recurrent left chest aches, without clear inciting factor, about twice weekly, last up to 5 minutes, no associated other symptoms, max intensity of 4/10. Also still with BELTRÁN, like walking or mowing. Hard to do anything due to BELTRÁN. Wants to be active.     Review of Systems   Constitutional: Positive for chills, diaphoresis, malaise/fatigue and weight loss (down 5 lbs from 5/2021). Negative for fever, night sweats and weight gain.   HENT:  Positive for ear discharge and tinnitus. Negative for nosebleeds.    Eyes:  Positive for redness. Negative for visual disturbance.   Cardiovascular:  Positive for chest pain, dyspnea on exertion and palpitations. Negative for claudication, cyanosis, irregular heartbeat, near-syncope, orthopnea and paroxysmal nocturnal dyspnea.   Respiratory:  Positive for sleep disturbances due to breathing and snoring. Negative " "for cough, shortness of breath and wheezing.         Dewart score 8 today a 12, awaken tired. Negative sleep test over 10 years ago   Endocrine: Positive for cold intolerance and heat intolerance. Negative for polydipsia and polyuria.   Hematologic/Lymphatic: Does not bruise/bleed easily.   Skin:  Positive for itching. Negative for color change, flushing, nail changes, poor wound healing and suspicious lesions.   Musculoskeletal:  Positive for back pain, joint pain, muscle cramps, muscle weakness and neck pain. Negative for arthritis, falls, gout and myalgias.   Gastrointestinal:  Positive for constipation. Negative for heartburn, hematemesis, hematochezia and melena.   Genitourinary:  Positive for bladder incontinence, incomplete emptying and pelvic pain.   Neurological:  Positive for aphonia, dizziness, headaches, light-headedness and paresthesias. Negative for excessive daytime sleepiness, focal weakness, numbness, vertigo and weakness.   Psychiatric/Behavioral:  Positive for depression and memory loss. Negative for substance abuse. The patient does not have insomnia and is not nervous/anxious.       Objective:    Physical Exam  Constitutional:       Appearance: She is well-developed.      Comments: RA O2 sat 96%   HENT:      Head: Normocephalic.   Eyes:      Conjunctiva/sclera: Conjunctivae normal.      Pupils: Pupils are equal, round, and reactive to light.   Neck:      Thyroid: No thyromegaly.      Vascular: No JVD.      Comments: Circumference 14"  Cardiovascular:      Rate and Rhythm: Normal rate and regular rhythm.      Pulses: Intact distal pulses.      Heart sounds: Normal heart sounds. No murmur heard.    No friction rub. No gallop.   Pulmonary:      Effort: Pulmonary effort is normal.      Breath sounds: No rales.      Comments: Diminished breath sounds and prolong expiration.  Chest:      Chest wall: No tenderness.   Abdominal:      General: Bowel sounds are normal.      Palpations: Abdomen is soft. " "     Tenderness: There is no abdominal tenderness.      Comments: Waist 44.25" down to 42.5", hip 42"   Musculoskeletal:         General: Normal range of motion.      Cervical back: Normal range of motion and neck supple.   Lymphadenopathy:      Cervical: No cervical adenopathy.   Skin:     General: Skin is warm and dry.      Findings: No rash.   Neurological:      Mental Status: She is alert and oriented to person, place, and time.         Assessment:       1. Atypical chest pain    2. Essential hypertension, restarted Rx 2014    3. Family history of premature CAD    4. Cardiovascular event risk, ASCVD 10-year risk 7.1%, on 10 mg of Crestor    5. History of smoking greater than 50 pack years, quit 2014, 75+ py    6. Prediabetes    7. Abnormal nuclear stress test    8. Excessive daytime sleepiness    9. Snores    10. Chronic fatigue    11. Other emphysema    12. BELTRÁN (dyspnea on exertion)    13. Abdominal obesity         Plan:       Diagnoses and all orders for this visit:    Atypical chest pain  -     IN OFFICE EKG 12-LEAD (to Muse)  -     Stress Echo; Future    Essential hypertension, restarted Rx 2014    Family history of premature CAD  -     Stress Echo; Future    Cardiovascular event risk, ASCVD 10-year risk 7.1%, on 10 mg of Crestor  -     Stress Echo; Future    History of smoking greater than 50 pack years, quit 2014, 75+ py    Prediabetes    Abnormal nuclear stress test    Excessive daytime sleepiness    Snores    Chronic fatigue    Other emphysema    BELTRÁN (dyspnea on exertion)    Abdominal obesity    - All medical issues reviewed, continue current Rx  - Warning signs of MI and stroke given, would chew 2-4 low-dose ASA (81 mg) if symptom last more than 5 minutes and call 911.  - Decline info on RENATA and testing  - CV status reviewed, patient acknowledge good understanding.  - Recommend healthy living: healthy diet and regular exercise aiming for fitness, restorative sleep and weight control  - Need good " exercise program, 4 key elements: 1. Aerobic (walking, swimming, dancing, jogging, biking, etc, 2. Muscle strengthening / resistance exercise, need to do 2-3 times weekly, 3. Stretching daily, good stretch takes a whole  total minute. 4. Balance exercise daily.   - Instruction for Mediterranean diet and heart healthy exercise given.  - Check home blood pressure, 2 days weekly, do 2 readings within 5 minutes in AM and PM, keep log for review. Target resting BP is less than 130/80.   - Weigh twice weekly, try to lose 1-2 lbs per week. Target weight loss of 5%-10%.  - Highly recommend 30-60 minutes of exercise / activities daily, can have Sunday off, with 2-3 sessions of muscle strengthening weekly. A  would be very helpful.  - Recommend at least annual cardiovascular evaluation in view of patient's significant risk factors.  - Phone review / encourage use of [facility]    Total time spend including review of record prior to face-to-face visit is 40 minutes. Greater than 50% of the time was spent in counseling and coordination of care. The above assessment and plan have been discussed at length. Referring provider's note reviewed. Labs and procedure over the last 6 months reviewed. Problem List updated. Asked to bring in all active medications / pills bottles with next visit. Will send note to referring / PCP.

## 2023-04-13 ENCOUNTER — PATIENT MESSAGE (OUTPATIENT)
Dept: CARDIOLOGY | Facility: CLINIC | Age: 63
End: 2023-04-13
Payer: MEDICARE

## 2023-04-17 ENCOUNTER — PATIENT MESSAGE (OUTPATIENT)
Dept: FAMILY MEDICINE | Facility: CLINIC | Age: 63
End: 2023-04-17
Payer: MEDICARE

## 2023-04-17 DIAGNOSIS — J43.8 OTHER EMPHYSEMA: ICD-10-CM

## 2023-04-19 RX ORDER — ALBUTEROL SULFATE 90 UG/1
2 AEROSOL, METERED RESPIRATORY (INHALATION) EVERY 6 HOURS PRN
Qty: 18 G | Refills: 11 | Status: SHIPPED | OUTPATIENT
Start: 2023-04-19 | End: 2023-09-12 | Stop reason: SDUPTHER

## 2023-04-20 ENCOUNTER — PATIENT MESSAGE (OUTPATIENT)
Dept: FAMILY MEDICINE | Facility: CLINIC | Age: 63
End: 2023-04-20
Payer: MEDICARE

## 2023-04-20 DIAGNOSIS — J43.8 OTHER EMPHYSEMA: Primary | ICD-10-CM

## 2023-04-21 DIAGNOSIS — J43.8 OTHER EMPHYSEMA: Primary | ICD-10-CM

## 2023-04-21 NOTE — TELEPHONE ENCOUNTER
----- Message from Amberly Hickey sent at 4/21/2023  3:05 PM CDT -----  Contact: pt  Type: Needs Medical Advice  Who Called:  pt     Pharmacy name and phone #:        [id number] [place] [place] [facility] [address]  [address]  [place] [address]  Phone: [phone] Fax: [phone]      Best Call Back Number: [phone]    Additional Information: pt states she left several msg in my chart stating her fluticasone-umeclidin-vilanter (TRELEGY ELLIPTA) 200-62.5-25 mcg inhaler is supposed to be 100mcg not 200. Please advise.

## 2023-05-05 ENCOUNTER — PATIENT MESSAGE (OUTPATIENT)
Dept: CARDIOLOGY | Facility: CLINIC | Age: 63
End: 2023-05-05
Payer: MEDICARE

## 2023-05-16 DIAGNOSIS — I10 ESSENTIAL HYPERTENSION: Primary | Chronic | ICD-10-CM

## 2023-05-16 DIAGNOSIS — E78.5 HYPERLIPIDEMIA, UNSPECIFIED HYPERLIPIDEMIA TYPE: ICD-10-CM

## 2023-05-16 DIAGNOSIS — R94.39 ABNORMAL CARDIOVASCULAR STRESS TEST: ICD-10-CM

## 2023-05-16 DIAGNOSIS — F41.9 ANXIETY: ICD-10-CM

## 2023-05-16 RX ORDER — LISINOPRIL 20 MG/1
20 TABLET ORAL DAILY
Qty: 90 TABLET | Refills: 3 | Status: SHIPPED | OUTPATIENT
Start: 2023-05-16

## 2023-05-16 RX ORDER — ROSUVASTATIN CALCIUM 40 MG/1
40 TABLET, COATED ORAL DAILY
Qty: 90 TABLET | Refills: 3 | Status: SHIPPED | OUTPATIENT
Start: 2023-05-16 | End: 2024-05-15

## 2023-05-16 RX ORDER — BUPROPION HYDROCHLORIDE 150 MG/1
150 TABLET ORAL DAILY
Qty: 90 TABLET | Refills: 3 | Status: SHIPPED | OUTPATIENT
Start: 2023-05-16

## 2023-05-16 NOTE — TELEPHONE ENCOUNTER
----- Message from Zari Loo sent at 5/16/2023  9:43 AM CDT -----  Contact: Ava from [facility]  Type:  RX Refill Request    Who Called: Ava from [facility]  Refill or New Rx:refill rx  RX Name and Strength: buPROPion (WELLBUTRIN XL) 150 MG TB24 tablet, estradioL (ESTRACE) 0.5 MG tablet,  lisinopriL (PRINIVIL,ZESTRIL) 20 MG tablet and rosuvastatin (CRESTOR) 40 MG Tab  How is the patient currently taking it? (ex. 1XDay): as directed  Is this a 30 day or 90 day RX:as directed  Preferred Pharmacy with phone number:      [facility] - [place], [place] - [address] [facility] [address] [facility] [address]  [address] [facility] [facility] [address]  Phone: [phone] Fax: [phone]          Local or Mail Order:mail order  Ordering Provider: tashia  Would the patient rather a call back or a response via [facility]? Call  Best Call Back Number:[phone] (home)     Additional Information: pt would like to switch to [facility] phar instead of last phar. Please advise and thank you.

## 2023-06-01 ENCOUNTER — PATIENT MESSAGE (OUTPATIENT)
Dept: FAMILY MEDICINE | Facility: CLINIC | Age: 63
End: 2023-06-01
Payer: MEDICARE

## 2023-06-06 ENCOUNTER — PATIENT MESSAGE (OUTPATIENT)
Dept: CARDIOLOGY | Facility: CLINIC | Age: 63
End: 2023-06-06
Payer: MEDICARE

## 2023-06-07 ENCOUNTER — HOSPITAL ENCOUNTER (OUTPATIENT)
Dept: CARDIOLOGY | Facility: HOSPITAL | Age: 63
Discharge: HOME | End: 2023-06-07
Attending: INTERNAL MEDICINE
Payer: MEDICARE

## 2023-06-07 VITALS
BODY MASS INDEX: 28.93 KG/M2 | WEIGHT: 180 LBS | DIASTOLIC BLOOD PRESSURE: 80 MMHG | SYSTOLIC BLOOD PRESSURE: 121 MMHG | HEIGHT: 66 IN | HEART RATE: 69 BPM

## 2023-06-07 DIAGNOSIS — R07.89 ATYPICAL CHEST PAIN: ICD-10-CM

## 2023-06-07 DIAGNOSIS — Z82.49 FAMILY HISTORY OF PREMATURE CAD: ICD-10-CM

## 2023-06-07 DIAGNOSIS — Z91.89 CARDIOVASCULAR EVENT RISK: ICD-10-CM

## 2023-06-07 LAB
AORTIC ROOT ANNULUS: 2.57 CM
AORTIC VALVE CUSP SEPERATION: 1.59 CM
ASCENDING AORTA: 2.25 CM
BSA FOR ECHO PROCEDURE: 1.95 M2
CV ECHO LV RWT: 0.55 CM
CV STRESS BASE HR: 69 BPM
DIASTOLIC BLOOD PRESSURE: 80 MMHG
DOP CALC LVOT AREA: 2.8 CM2
DOP CALC LVOT DIAMETER: 1.89 CM
ECHO LV POSTERIOR WALL: 0.98 CM (ref 0.6–1.1)
EJECTION FRACTION: 55 %
FRACTIONAL SHORTENING: 28 % (ref 28–44)
INTERVENTRICULAR SEPTUM: 1 CM (ref 0.6–1.1)
LA MAJOR: 3.52 CM
LA MINOR: 3.53 CM
LEFT ATRIUM SIZE: 3.68 CM
LEFT ATRIUM VOLUME INDEX MOD: 5.4 ML/M2
LEFT ATRIUM VOLUME MOD: 10.31 CM3
LEFT INTERNAL DIMENSION IN SYSTOLE: 2.59 CM (ref 2.1–4)
LEFT VENTRICLE DIASTOLIC VOLUME INDEX: 28.25 ML/M2
LEFT VENTRICLE DIASTOLIC VOLUME: 53.96 ML
LEFT VENTRICLE MASS INDEX: 55 G/M2
LEFT VENTRICLE SYSTOLIC VOLUME INDEX: 12.8 ML/M2
LEFT VENTRICLE SYSTOLIC VOLUME: 24.47 ML
LEFT VENTRICULAR INTERNAL DIMENSION IN DIASTOLE: 3.59 CM (ref 3.5–6)
LEFT VENTRICULAR MASS: 105.88 G
OHS CV CPX 1 MINUTE RECOVERY HEART RATE: 113 BPM
OHS CV CPX 85 PERCENT MAX PREDICTED HEART RATE MALE: 126
OHS CV CPX ESTIMATED METS: 10
OHS CV CPX MAX PREDICTED HEART RATE: 149
OHS CV CPX PATIENT IS FEMALE: 1
OHS CV CPX PATIENT IS MALE: 0
OHS CV CPX PEAK DIASTOLIC BLOOD PRESSURE: 54 MMHG
OHS CV CPX PEAK HEAR RATE: 151 BPM
OHS CV CPX PEAK RATE PRESSURE PRODUCT: NORMAL
OHS CV CPX PEAK SYSTOLIC BLOOD PRESSURE: 124 MMHG
OHS CV CPX PERCENT MAX PREDICTED HEART RATE ACHIEVED: 101
OHS CV CPX RATE PRESSURE PRODUCT PRESENTING: 8349
RA MAJOR: 3.8 CM
RA WIDTH: 3.14 CM
RIGHT VENTRICULAR END-DIASTOLIC DIMENSION: 2.62 CM
RIGHT VENTRICULAR LENGTH IN DIASTOLE (APICAL 4-CHAMBER VIEW): 4.62 CM
RV MID DIAMA: 2.39 CM
STJ: 2.48 CM
STRESS ECHO POST EXERCISE DUR MIN: 6 MINUTES
STRESS ECHO POST EXERCISE DUR SEC: 19 SECONDS
SYSTOLIC BLOOD PRESSURE: 121 MMHG

## 2023-06-07 PROCEDURE — 93351 STRESS ECHO (CUPID ONLY): ICD-10-PCS | Mod: 26,,, | Performed by: INTERNAL MEDICINE

## 2023-06-07 PROCEDURE — 93351 STRESS TTE COMPLETE: CPT | Mod: 26,,, | Performed by: INTERNAL MEDICINE

## 2023-06-07 PROCEDURE — 93351 STRESS TTE COMPLETE: CPT

## 2023-06-07 NOTE — NURSING NOTE
2 patient identifier name and date of birth confirmed as stated by patient and matched with armband. Informed consent obtained. Dr Molina here.

## 2023-06-12 ENCOUNTER — OFFICE VISIT (OUTPATIENT)
Dept: FAMILY MEDICINE | Facility: CLINIC | Age: 63
End: 2023-06-12
Payer: MEDICARE

## 2023-06-12 VITALS
DIASTOLIC BLOOD PRESSURE: 78 MMHG | SYSTOLIC BLOOD PRESSURE: 123 MMHG | WEIGHT: 176.63 LBS | HEIGHT: 66 IN | BODY MASS INDEX: 28.39 KG/M2 | HEART RATE: 61 BPM | OXYGEN SATURATION: 95 %

## 2023-06-12 DIAGNOSIS — Z87.891 HISTORY OF SMOKING GREATER THAN 50 PACK YEARS: ICD-10-CM

## 2023-06-12 DIAGNOSIS — Z91.89 CARDIOVASCULAR EVENT RISK: ICD-10-CM

## 2023-06-12 DIAGNOSIS — Z82.49 FAMILY HISTORY OF PREMATURE CAD: Primary | ICD-10-CM

## 2023-06-12 DIAGNOSIS — R53.82 CHRONIC FATIGUE: ICD-10-CM

## 2023-06-12 DIAGNOSIS — R07.9 CHEST PAIN AT REST: ICD-10-CM

## 2023-06-12 DIAGNOSIS — R06.02 SOB (SHORTNESS OF BREATH): ICD-10-CM

## 2023-06-12 PROCEDURE — 99999 PR PBB SHADOW E&M-EST. PATIENT-LVL III: ICD-10-PCS | Mod: PBBFAC,,, | Performed by: NURSE PRACTITIONER

## 2023-06-12 PROCEDURE — 99999 PR PBB SHADOW E&M-EST. PATIENT-LVL III: CPT | Mod: PBBFAC,,, | Performed by: NURSE PRACTITIONER

## 2023-06-12 PROCEDURE — 99214 OFFICE O/P EST MOD 30 MIN: CPT | Mod: S$PBB,,, | Performed by: NURSE PRACTITIONER

## 2023-06-12 PROCEDURE — 99214 PR OFFICE/OUTPT VISIT, EST, LEVL IV, 30-39 MIN: ICD-10-PCS | Mod: S$PBB,,, | Performed by: NURSE PRACTITIONER

## 2023-06-12 PROCEDURE — 99213 OFFICE O/P EST LOW 20 MIN: CPT | Mod: PBBFAC | Performed by: NURSE PRACTITIONER

## 2023-06-12 NOTE — PROGRESS NOTES
"Subjective:       Patient ID: Josseline Degroot is a 63 y.o. female.    Chief Complaint: Follow-up (Stress test, not happy about Dr/Still have chest pain and light headed even though ST says its normal. )  -  The pt is a 64 y/o female that presents for follow up. At our visit she c/o "fatigue, Sob, CP at rest which she still has, approx 1-2 xs a month. Appears angiogram recommended yet fearful of procedure d/t mother  during angiogram thus tried to educate."    Was seen by Dr. Molina and underwent stress test that was essentially normal yet pt reports barely being able to complete treadmill and became very SOB and had to stop the test. She is not satisfied with results and wants a second opinion as she feel like the SOB with stress test was unrelated to anxiety. Recent stress test and echo reviewed and discussed with pt.     Smoked 40+ yrs 1 1/2 PPD thus will do PFT to eval pulmonary status. Yet SOB does not explain CP at rest. Does use trelegy qd yet recommend albuteral inh before cutting the grass.    Expressed prefers female physicians thus discussed MA expressed pt would like MD over NP yet when discussed pt reports wanting to stay with this NP for now.   -  Past Medical History:   Diagnosis Date    Colon polyp     COPD (chronic obstructive pulmonary disease)     Depression     GERD (gastroesophageal reflux disease)     Hyperlipidemia     Hypertension     Rheumatic fever in pediatric patient     as a child        Past Surgical History:   Procedure Laterality Date    BREAST BIOPSY Left     COLONOSCOPY      COLONOSCOPY N/A 2020    Procedure: COLONOSCOPY;  Surgeon: Hao Roger MD;  Location: [facility] ENDO;  Service: Endoscopy;  Laterality: N/A;    ESOPHAGOGASTRODUODENOSCOPY N/A 2020    Procedure: EGD (ESOPHAGOGASTRODUODENOSCOPY);  Surgeon: Hao Roger MD;  Location: [facility] ENDO;  Service: Endoscopy;  Laterality: N/A;    HYSTERECTOMY      TOTAL    INJECTION OF ANESTHETIC AGENT AROUND MEDIAL BRANCH NERVES " INNERVATING LUMBAR FACET JOINT Right 2020    Procedure: Block-nerve-medial branch-lumbar;  Surgeon: Sergey Perez MD;  Location: [facility] OR;  Service: Pain Management;  Laterality: Right;  L3,4,5    OOPHORECTOMY      RADIOFREQUENCY ABLATION OF LUMBAR MEDIAL BRANCH NERVE AT SINGLE LEVEL Right 2020    Procedure: Radiofrequency Ablation, Nerve, Spinal, Lumbar, Medial Branch, 1 Level;  Surgeon: Sergey Perez MD;  Location: [facility] OR;  Service: Pain Management;  Laterality: Right;  L3,4,5        Social History     Socioeconomic History    Marital status:    Tobacco Use    Smoking status: Former     Types: Cigarettes     Quit date: 10/1/2017     Years since quittin.6    Smokeless tobacco: Never   Substance and Sexual Activity    Alcohol use: No    Drug use: Not Currently    Sexual activity: Yes     Partners: Male       Family History   Problem Relation Age of Onset    Heart disease Mother     Heart disease Father     Cancer Father     Heart disease Brother     Breast cancer Paternal Aunt     Breast cancer Paternal Grandmother        Review of patient's allergies indicates:   Allergen Reactions    Amitriptyline Other (See Comments)     Dizzy, lightheaded, photophobia, sleepy, irritable  Dizzy, lightheaded, photophobia, sleepy, irritable            Current Outpatient Medications:     albuterol (PROVENTIL/VENTOLIN HFA) 90 mcg/actuation inhaler, Inhale 2 puffs into the lungs every 6 (six) hours as needed for Wheezing. Rescue, Disp: 18 g, Rfl: 11    ascorbic acid, vitamin C, (VITAMIN C) 1000 MG tablet, Take 1,000 mg by mouth., Disp: , Rfl:     buPROPion (WELLBUTRIN XL) 150 MG TB24 tablet, Take 1 tablet (150 mg total) by mouth once daily., Disp: 90 tablet, Rfl: 3    estradioL (ESTRACE) 0.5 MG tablet, TAKE 1 TABLET DAILY, Disp: 90 tablet, Rfl: 3    famotidine (PEPCID) 40 MG tablet, Take 40 mg by mouth nightly., Disp: , Rfl:     fluticasone-umeclidin-vilanter (TRELEGY ELLIPTA) 100-62.5-25 mcg DsDv, Inhale 1 puff into  the lungs once daily., Disp: 30 each, Rfl: 11    lisinopriL (PRINIVIL,ZESTRIL) 20 MG tablet, Take 1 tablet (20 mg total) by mouth once daily., Disp: 90 tablet, Rfl: 3    rosuvastatin (CRESTOR) 40 MG Tab, Take 1 tablet (40 mg total) by mouth once daily., Disp: 90 tablet, Rfl: 3    zinc gluconate 50 mg tablet, Take 1 tablet (50 mg total) by mouth once daily., Disp: , Rfl:     Follow-up  Associated symptoms include chest pain and fatigue.   Review of Systems   Constitutional:  Positive for fatigue.   HENT: Negative.     Eyes: Negative.    Respiratory:  Positive for chest tightness and shortness of breath.    Cardiovascular:  Positive for chest pain.   Gastrointestinal: Negative.    Endocrine: Negative.    Genitourinary: Negative.    Musculoskeletal: Negative.    Skin: Negative.         Mole assessment requested    Allergic/Immunologic: Negative.    Neurological: Negative.    Hematological: Negative.    Psychiatric/Behavioral: Negative.       Objective:      Physical Exam  Vitals reviewed.   Constitutional:       General: She is not in acute distress.     Appearance: Normal appearance. She is well-developed. She is not ill-appearing.   HENT:      Head: Normocephalic.   Eyes:      Conjunctiva/sclera: Conjunctivae normal.   Neck:      Thyroid: No thyromegaly.   Cardiovascular:      Rate and Rhythm: Normal rate and regular rhythm.      Heart sounds: Normal heart sounds. No murmur heard.  Pulmonary:      Effort: Pulmonary effort is normal.      Breath sounds: Normal breath sounds. No wheezing or rales.   Musculoskeletal:         General: Normal range of motion.      Cervical back: Normal range of motion.      Right lower leg: No edema.      Left lower leg: No edema.   Skin:     General: Skin is warm and dry.      Comments: No concerning moles noted   Neurological:      Mental Status: She is alert and oriented to person, place, and time. Mental status is at baseline.   Psychiatric:         Mood and Affect: Mood normal.          Behavior: Behavior normal.         Thought Content: Thought content normal.         Judgment: Judgment normal.       Assessment:       1. Family history of premature CAD    2. Cardiovascular event risk, ASCVD 10-year risk 7.1%, on 10 mg of Crestor    3. Chest pain at rest    4. SOB (shortness of breath)    5. Chronic fatigue    6. History of smoking greater than 50 pack years, quit 2014, 75+ py        Plan:     1- referred Jeffery BOYD for second opinion  2- PFT  3- Dexa discussed as normal.  4- RTC 3 mo  ----    Family history of premature CAD  -     Ambulatory referral/consult to Cardiology; Future; Expected date: 06/19/2023    Cardiovascular event risk, ASCVD 10-year risk 7.1%, on 10 mg of Crestor  -     Ambulatory referral/consult to Cardiology; Future; Expected date: 06/19/2023    Chest pain at rest  -     Ambulatory referral/consult to Cardiology; Future; Expected date: 06/19/2023    SOB (shortness of breath)  -     Ambulatory referral/consult to Cardiology; Future; Expected date: 06/19/2023  -     Complete PFT w/ bronchodilator; Future    Chronic fatigue  -     Ambulatory referral/consult to Cardiology; Future; Expected date: 06/19/2023  -     Complete PFT w/ bronchodilator; Future    History of smoking greater than 50 pack years, quit 2014, 75+ py  -     Complete PFT w/ bronchodilator; Future        Risks, benefits, and side effects were discussed with the patient. All questions were answered to the fullest satisfaction of the patient, and pt verbalized understanding and agreement to treatment plan. Pt was to call with any new or worsening symptoms, or present to the ER.

## 2023-06-28 ENCOUNTER — PATIENT MESSAGE (OUTPATIENT)
Dept: FAMILY MEDICINE | Facility: CLINIC | Age: 63
End: 2023-06-28
Payer: MEDICARE

## 2023-08-18 ENCOUNTER — PATIENT MESSAGE (OUTPATIENT)
Dept: FAMILY MEDICINE | Facility: CLINIC | Age: 63
End: 2023-08-18
Payer: MEDICARE

## 2023-08-18 DIAGNOSIS — R25.2 LEG CRAMPS: Primary | ICD-10-CM

## 2023-08-21 NOTE — TELEPHONE ENCOUNTER
Pt is requesting refill of Mag-Tab Sr 84 MG. Please advise if you would like labs prior to refilling medication.

## 2023-08-23 RX ORDER — MAGNESIUM L-LACTATE 84 MG
84 TABLET, EXTENDED RELEASE ORAL DAILY
Qty: 90 TABLET | Refills: 3 | Status: SHIPPED | OUTPATIENT
Start: 2023-08-23 | End: 2023-09-12 | Stop reason: HOSPADM

## 2023-09-12 ENCOUNTER — OFFICE VISIT (OUTPATIENT)
Dept: FAMILY MEDICINE | Facility: CLINIC | Age: 63
End: 2023-09-12
Payer: MEDICARE

## 2023-09-12 ENCOUNTER — LAB VISIT (OUTPATIENT)
Dept: LAB | Facility: HOSPITAL | Age: 63
End: 2023-09-12
Attending: NURSE PRACTITIONER
Payer: MEDICARE

## 2023-09-12 VITALS
HEART RATE: 59 BPM | BODY MASS INDEX: 27.93 KG/M2 | DIASTOLIC BLOOD PRESSURE: 78 MMHG | HEIGHT: 66 IN | WEIGHT: 173.81 LBS | SYSTOLIC BLOOD PRESSURE: 102 MMHG | RESPIRATION RATE: 17 BRPM

## 2023-09-12 DIAGNOSIS — Z79.899 HIGH RISK MEDICATION USE: ICD-10-CM

## 2023-09-12 DIAGNOSIS — R73.03 PREDIABETES: ICD-10-CM

## 2023-09-12 DIAGNOSIS — E53.8 B12 DEFICIENCY: ICD-10-CM

## 2023-09-12 DIAGNOSIS — Z12.31 BREAST CANCER SCREENING BY MAMMOGRAM: ICD-10-CM

## 2023-09-12 DIAGNOSIS — E78.2 MIXED HYPERLIPIDEMIA: Chronic | ICD-10-CM

## 2023-09-12 DIAGNOSIS — F33.1 MODERATE EPISODE OF RECURRENT MAJOR DEPRESSIVE DISORDER: ICD-10-CM

## 2023-09-12 DIAGNOSIS — E55.9 VITAMIN D DEFICIENCY: ICD-10-CM

## 2023-09-12 DIAGNOSIS — M25.531 RIGHT WRIST PAIN: Primary | ICD-10-CM

## 2023-09-12 DIAGNOSIS — J43.8 OTHER EMPHYSEMA: ICD-10-CM

## 2023-09-12 LAB
25(OH)D3+25(OH)D2 SERPL-MCNC: 43 NG/ML (ref 30–96)
ALBUMIN SERPL BCP-MCNC: 3.8 G/DL (ref 3.5–5.2)
ALP SERPL-CCNC: 70 U/L (ref 55–135)
ALT SERPL W/O P-5'-P-CCNC: 23 U/L (ref 10–44)
ANION GAP SERPL CALC-SCNC: 7 MMOL/L (ref 8–16)
AST SERPL-CCNC: 16 U/L (ref 10–40)
BASOPHILS # BLD AUTO: 0.02 K/UL (ref 0–0.2)
BASOPHILS NFR BLD: 0.3 % (ref 0–1.9)
BILIRUB SERPL-MCNC: 0.6 MG/DL (ref 0.1–1)
BUN SERPL-MCNC: 14 MG/DL (ref 8–23)
CALCIUM SERPL-MCNC: 9.1 MG/DL (ref 8.7–10.5)
CHLORIDE SERPL-SCNC: 108 MMOL/L (ref 95–110)
CHOLEST SERPL-MCNC: 156 MG/DL (ref 120–199)
CHOLEST/HDLC SERPL: 3.1 {RATIO} (ref 2–5)
CO2 SERPL-SCNC: 26 MMOL/L (ref 23–29)
CREAT SERPL-MCNC: 0.8 MG/DL (ref 0.5–1.4)
DIFFERENTIAL METHOD: NORMAL
EOSINOPHIL # BLD AUTO: 0.2 K/UL (ref 0–0.5)
EOSINOPHIL NFR BLD: 3 % (ref 0–8)
ERYTHROCYTE [DISTWIDTH] IN BLOOD BY AUTOMATED COUNT: 13.3 % (ref 11.5–14.5)
EST. GFR  (NO RACE VARIABLE): >60 ML/MIN/1.73 M^2
ESTIMATED AVG GLUCOSE: 117 MG/DL (ref 68–131)
GLUCOSE SERPL-MCNC: 101 MG/DL (ref 70–110)
HBA1C MFR BLD: 5.7 % (ref 4–5.6)
HCT VFR BLD AUTO: 41.8 % (ref 37–48.5)
HDLC SERPL-MCNC: 50 MG/DL (ref 40–75)
HDLC SERPL: 32.1 % (ref 20–50)
HGB BLD-MCNC: 13.7 G/DL (ref 12–16)
IMM GRANULOCYTES # BLD AUTO: 0.02 K/UL (ref 0–0.04)
IMM GRANULOCYTES NFR BLD AUTO: 0.3 % (ref 0–0.5)
LDLC SERPL CALC-MCNC: 87 MG/DL (ref 63–159)
LYMPHOCYTES # BLD AUTO: 1.6 K/UL (ref 1–4.8)
LYMPHOCYTES NFR BLD: 21.4 % (ref 18–48)
MCH RBC QN AUTO: 29.1 PG (ref 27–31)
MCHC RBC AUTO-ENTMCNC: 32.8 G/DL (ref 32–36)
MCV RBC AUTO: 89 FL (ref 82–98)
MONOCYTES # BLD AUTO: 0.6 K/UL (ref 0.3–1)
MONOCYTES NFR BLD: 8.1 % (ref 4–15)
NEUTROPHILS # BLD AUTO: 4.8 K/UL (ref 1.8–7.7)
NEUTROPHILS NFR BLD: 66.9 % (ref 38–73)
NONHDLC SERPL-MCNC: 106 MG/DL
NRBC BLD-RTO: 0 /100 WBC
PLATELET # BLD AUTO: 180 K/UL (ref 150–450)
PMV BLD AUTO: 11.1 FL (ref 9.2–12.9)
POTASSIUM SERPL-SCNC: 4.5 MMOL/L (ref 3.5–5.1)
PROT SERPL-MCNC: 6.3 G/DL (ref 6–8.4)
RBC # BLD AUTO: 4.7 M/UL (ref 4–5.4)
SODIUM SERPL-SCNC: 141 MMOL/L (ref 136–145)
T4 FREE SERPL-MCNC: 0.86 NG/DL (ref 0.71–1.51)
TRIGL SERPL-MCNC: 95 MG/DL (ref 30–150)
TSH SERPL DL<=0.005 MIU/L-ACNC: 2.19 UIU/ML (ref 0.4–4)
VIT B12 SERPL-MCNC: 720 PG/ML (ref 210–950)
WBC # BLD AUTO: 7.24 K/UL (ref 3.9–12.7)

## 2023-09-12 PROCEDURE — 83036 HEMOGLOBIN GLYCOSYLATED A1C: CPT | Performed by: NURSE PRACTITIONER

## 2023-09-12 PROCEDURE — 82607 VITAMIN B-12: CPT | Performed by: NURSE PRACTITIONER

## 2023-09-12 PROCEDURE — 99214 OFFICE O/P EST MOD 30 MIN: CPT | Mod: S$PBB,,, | Performed by: NURSE PRACTITIONER

## 2023-09-12 PROCEDURE — 99213 OFFICE O/P EST LOW 20 MIN: CPT | Mod: PBBFAC | Performed by: NURSE PRACTITIONER

## 2023-09-12 PROCEDURE — 99214 PR OFFICE/OUTPT VISIT, EST, LEVL IV, 30-39 MIN: ICD-10-PCS | Mod: S$PBB,,, | Performed by: NURSE PRACTITIONER

## 2023-09-12 PROCEDURE — 36415 COLL VENOUS BLD VENIPUNCTURE: CPT | Performed by: NURSE PRACTITIONER

## 2023-09-12 PROCEDURE — 99999 PR PBB SHADOW E&M-EST. PATIENT-LVL III: CPT | Mod: PBBFAC,,, | Performed by: NURSE PRACTITIONER

## 2023-09-12 PROCEDURE — 85025 COMPLETE CBC W/AUTO DIFF WBC: CPT | Performed by: NURSE PRACTITIONER

## 2023-09-12 PROCEDURE — 82306 VITAMIN D 25 HYDROXY: CPT | Performed by: NURSE PRACTITIONER

## 2023-09-12 PROCEDURE — 80061 LIPID PANEL: CPT | Performed by: NURSE PRACTITIONER

## 2023-09-12 PROCEDURE — 84443 ASSAY THYROID STIM HORMONE: CPT | Performed by: NURSE PRACTITIONER

## 2023-09-12 PROCEDURE — 84439 ASSAY OF FREE THYROXINE: CPT | Performed by: NURSE PRACTITIONER

## 2023-09-12 PROCEDURE — 99999 PR PBB SHADOW E&M-EST. PATIENT-LVL III: ICD-10-PCS | Mod: PBBFAC,,, | Performed by: NURSE PRACTITIONER

## 2023-09-12 PROCEDURE — 80053 COMPREHEN METABOLIC PANEL: CPT | Performed by: NURSE PRACTITIONER

## 2023-09-12 RX ORDER — ALBUTEROL SULFATE 90 UG/1
2 AEROSOL, METERED RESPIRATORY (INHALATION) EVERY 6 HOURS PRN
Qty: 18 G | Refills: 11
Start: 2023-09-12

## 2023-09-12 NOTE — PROGRESS NOTES
Subjective:       Patient ID: Josseline Degroot is a 63 y.o. female.    Chief Complaint: Follow-up, Wrist Pain (Left, would like xray done, not sure why it hurting), Neck Pain, and request full labs to be drawn (Patient is fasting )  -  The patient is a 62 y/o female that presents with c/o right wrist pain 2+ months, denies injury and has not taken any OTC aids or used a brace. 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     Neck pain daily yet talks noting and exacerbated by looking down playing games.     Request fasting labs feels like something is wrong sometimes but could not give example other than intermittent crying. PHQ-9 =2 or minimal to none taking Wellbutrin 150 mg qd.  -     Past Medical History:   Diagnosis Date    Colon polyp     COPD (chronic obstructive pulmonary disease)     Depression     GERD (gastroesophageal reflux disease)     Hyperlipidemia     Hypertension     Rheumatic fever in pediatric patient     as a child        Past Surgical History:   Procedure Laterality Date    BREAST BIOPSY Left     COLONOSCOPY      COLONOSCOPY N/A 9/23/2020    Procedure: COLONOSCOPY;  Surgeon: Hao Roger MD;  Location: [facility];  Service: Endoscopy;  Laterality: N/A;    ESOPHAGOGASTRODUODENOSCOPY N/A 9/23/2020    Procedure: EGD (ESOPHAGOGASTRODUODENOSCOPY);  Surgeon: Hao Roger MD;  Location: [facility];  Service: Endoscopy;  Laterality: N/A;    HYSTERECTOMY      TOTAL    INJECTION OF ANESTHETIC AGENT AROUND MEDIAL BRANCH NERVES INNERVATING LUMBAR FACET JOINT Right 2/4/2020    Procedure: Block-nerve-medial branch-lumbar;  Surgeon: Sergey Perez MD;  Location: [facility] OR;  Service: Pain Management;  Laterality: Right;  L3,4,5    OOPHORECTOMY      RADIOFREQUENCY ABLATION OF LUMBAR MEDIAL BRANCH NERVE AT SINGLE LEVEL Right 2/12/2020    Procedure: Radiofrequency Ablation, Nerve, Spinal, Lumbar, Medial Branch, 1 Level;  Surgeon: Sergey ARANDA  MD Chris;  Location: [facility] OR;  Service: Pain Management;  Laterality: Right;  L3,4,5        Social History     Socioeconomic History    Marital status:    Tobacco Use    Smoking status: Former     Current packs/day: 0.00     Types: Cigarettes     Quit date: 10/1/2017     Years since quittin.9    Smokeless tobacco: Never   Substance and Sexual Activity    Alcohol use: No    Drug use: Not Currently    Sexual activity: Yes     Partners: Male       Family History   Problem Relation Age of Onset    Heart disease Mother     Heart disease Father     Cancer Father     Heart disease Brother     Breast cancer Paternal Aunt     Breast cancer Paternal Grandmother        Review of patient's allergies indicates:   Allergen Reactions    Amitriptyline Other (See Comments)     Dizzy, lightheaded, photophobia, sleepy, irritable  Dizzy, lightheaded, photophobia, sleepy, irritable            Current Outpatient Medications:     ascorbic acid, vitamin C, (VITAMIN C) 1000 MG tablet, Take 1,000 mg by mouth., Disp: , Rfl:     buPROPion (WELLBUTRIN XL) 150 MG TB24 tablet, Take 1 tablet (150 mg total) by mouth once daily., Disp: 90 tablet, Rfl: 3    estradioL (ESTRACE) 0.5 MG tablet, TAKE 1 TABLET DAILY, Disp: 90 tablet, Rfl: 3    famotidine (PEPCID) 40 MG tablet, Take 40 mg by mouth nightly., Disp: , Rfl:     fluticasone-umeclidin-vilanter (TRELEGY ELLIPTA) 100-62.5-25 mcg DsDv, Inhale 1 puff into the lungs once daily., Disp: 30 each, Rfl: 11    lisinopriL (PRINIVIL,ZESTRIL) 20 MG tablet, Take 1 tablet (20 mg total) by mouth once daily., Disp: 90 tablet, Rfl: 3    rosuvastatin (CRESTOR) 40 MG Tab, Take 1 tablet (40 mg total) by mouth once daily., Disp: 90 tablet, Rfl: 3    albuterol (PROVENTIL/VENTOLIN HFA) 90 mcg/actuation inhaler, Inhale 2 puffs into the lungs every 6 (six) hours as needed for Wheezing. Rescue, Disp: 18 g, Rfl: 11    Follow-up  Associated symptoms include neck pain.   Wrist Pain     Neck Pain       Review of  Systems   Constitutional: Negative.    HENT: Negative.     Eyes: Negative.    Respiratory: Negative.     Cardiovascular: Negative.    Gastrointestinal: Negative.    Endocrine: Negative.    Genitourinary: Negative.    Musculoskeletal:  Positive for neck pain.        Right wrist pain   Skin: Negative.    Allergic/Immunologic: Negative.    Neurological: Negative.    Hematological: Negative.    Psychiatric/Behavioral: Negative.         Objective:      Physical Exam  Vitals and nursing note reviewed.   Constitutional:       General: She is not in acute distress.     Appearance: Normal appearance. She is well-developed and normal weight. She is not ill-appearing.   HENT:      Head: Normocephalic.   Eyes:      Conjunctiva/sclera: Conjunctivae normal.   Neck:      Thyroid: No thyromegaly.   Cardiovascular:      Rate and Rhythm: Normal rate and regular rhythm.      Heart sounds: Normal heart sounds. No murmur heard.  Pulmonary:      Effort: Pulmonary effort is normal.      Breath sounds: Normal breath sounds. No wheezing or rales.   Musculoskeletal:         General: Normal range of motion.      Cervical back: Normal range of motion.      Right lower leg: No edema.      Left lower leg: No edema.      Comments: Right wrist with no swelling, deformity or loss of ROM. Tender to touch on lateral side.    Skin:     General: Skin is warm and dry.   Neurological:      Mental Status: She is alert and oriented to person, place, and time. Mental status is at baseline.   Psychiatric:         Mood and Affect: Mood normal.         Behavior: Behavior normal.         Thought Content: Thought content normal.         Judgment: Judgment normal.         Assessment:       1. Right wrist pain    2. Breast cancer screening by mammogram    3. Other emphysema    4. B12 deficiency    5. Vitamin D deficiency    6. Moderate episode of recurrent major depressive disorder    7. Mixed hyperlipidemia    8. High risk medication use    9. Prediabetes       "  Plan:     1- PFT was ordered 6/12/23 yet pt has not heard from anyone.  2- mammo after 11/29/23  3- right wrist xray  4- prednisone taper/ortho referral/PT offered for wrist/neck declined "can I wait until it gets worse?"  5- RTC 6 mo  6-recommend wrist brace/NSAIDs x 5 days to see if improvement.   -      Right wrist pain  -     X-Ray Wrist Complete Right; Future; Expected date: 09/12/2023    Breast cancer screening by mammogram  -     Mammo Digital Screening Bilat w/ Delvin; Future; Expected date: 11/29/2023    Other emphysema  -     albuterol (PROVENTIL/VENTOLIN HFA) 90 mcg/actuation inhaler; Inhale 2 puffs into the lungs every 6 (six) hours as needed for Wheezing. Rescue  Dispense: 18 g; Refill: 11    B12 deficiency  -     Lipid Panel; Future; Expected date: 09/12/2023  -     CBC Auto Differential; Future; Expected date: 09/12/2023  -     Comprehensive Metabolic Panel; Future; Expected date: 09/12/2023  -     TSH; Future; Expected date: 09/12/2023  -     T4, Free; Future; Expected date: 09/12/2023  -     Vitamin D; Future; Expected date: 09/12/2023  -     Hemoglobin A1C; Future; Expected date: 09/12/2023  -     Vitamin B12; Future; Expected date: 09/12/2023    Vitamin D deficiency  -     Lipid Panel; Future; Expected date: 09/12/2023  -     CBC Auto Differential; Future; Expected date: 09/12/2023  -     Comprehensive Metabolic Panel; Future; Expected date: 09/12/2023  -     TSH; Future; Expected date: 09/12/2023  -     T4, Free; Future; Expected date: 09/12/2023  -     Vitamin D; Future; Expected date: 09/12/2023  -     Hemoglobin A1C; Future; Expected date: 09/12/2023  -     Vitamin B12; Future; Expected date: 09/12/2023    Moderate episode of recurrent major depressive disorder  -     Lipid Panel; Future; Expected date: 09/12/2023  -     CBC Auto Differential; Future; Expected date: 09/12/2023  -     Comprehensive Metabolic Panel; Future; Expected date: 09/12/2023  -     TSH; Future; Expected date: " 09/12/2023  -     T4, Free; Future; Expected date: 09/12/2023  -     Vitamin D; Future; Expected date: 09/12/2023  -     Hemoglobin A1C; Future; Expected date: 09/12/2023  -     Vitamin B12; Future; Expected date: 09/12/2023    Mixed hyperlipidemia  -     Lipid Panel; Future; Expected date: 09/12/2023  -     CBC Auto Differential; Future; Expected date: 09/12/2023  -     Comprehensive Metabolic Panel; Future; Expected date: 09/12/2023  -     TSH; Future; Expected date: 09/12/2023  -     T4, Free; Future; Expected date: 09/12/2023  -     Vitamin D; Future; Expected date: 09/12/2023  -     Hemoglobin A1C; Future; Expected date: 09/12/2023  -     Vitamin B12; Future; Expected date: 09/12/2023    High risk medication use  -     Lipid Panel; Future; Expected date: 09/12/2023  -     CBC Auto Differential; Future; Expected date: 09/12/2023  -     Comprehensive Metabolic Panel; Future; Expected date: 09/12/2023  -     TSH; Future; Expected date: 09/12/2023  -     T4, Free; Future; Expected date: 09/12/2023  -     Vitamin D; Future; Expected date: 09/12/2023  -     Hemoglobin A1C; Future; Expected date: 09/12/2023  -     Vitamin B12; Future; Expected date: 09/12/2023    Prediabetes  -     Lipid Panel; Future; Expected date: 09/12/2023  -     CBC Auto Differential; Future; Expected date: 09/12/2023  -     Comprehensive Metabolic Panel; Future; Expected date: 09/12/2023  -     TSH; Future; Expected date: 09/12/2023  -     T4, Free; Future; Expected date: 09/12/2023  -     Vitamin D; Future; Expected date: 09/12/2023  -     Hemoglobin A1C; Future; Expected date: 09/12/2023  -     Vitamin B12; Future; Expected date: 09/12/2023        Risks, benefits, and side effects were discussed with the patient. All questions were answered to the fullest satisfaction of the patient, and pt verbalized understanding and agreement to treatment plan. Pt was to call with any new or worsening symptoms, or present to the ER.

## 2023-09-28 ENCOUNTER — PATIENT MESSAGE (OUTPATIENT)
Dept: FAMILY MEDICINE | Facility: CLINIC | Age: 63
End: 2023-09-28
Payer: MEDICARE

## 2023-12-01 ENCOUNTER — HOSPITAL ENCOUNTER (OUTPATIENT)
Dept: RADIOLOGY | Facility: HOSPITAL | Age: 63
Discharge: HOME | End: 2023-12-01
Attending: NURSE PRACTITIONER
Payer: MEDICARE

## 2023-12-01 DIAGNOSIS — Z12.31 BREAST CANCER SCREENING BY MAMMOGRAM: ICD-10-CM

## 2023-12-01 PROCEDURE — 77063 MAMMO DIGITAL SCREENING BILAT WITH TOMO: ICD-10-PCS | Mod: 26,,, | Performed by: RADIOLOGY

## 2023-12-01 PROCEDURE — 77067 SCR MAMMO BI INCL CAD: CPT | Mod: TC

## 2023-12-01 PROCEDURE — 77067 SCR MAMMO BI INCL CAD: CPT | Mod: 26,,, | Performed by: RADIOLOGY

## 2023-12-01 PROCEDURE — 77067 MAMMO DIGITAL SCREENING BILAT WITH TOMO: ICD-10-PCS | Mod: 26,,, | Performed by: RADIOLOGY

## 2023-12-01 PROCEDURE — 77063 BREAST TOMOSYNTHESIS BI: CPT | Mod: 26,,, | Performed by: RADIOLOGY

## 2024-01-29 ENCOUNTER — TELEPHONE (OUTPATIENT)
Dept: HEMATOLOGY/ONCOLOGY | Facility: CLINIC | Age: 64
End: 2024-01-29
Payer: MEDICARE

## 2024-01-29 DIAGNOSIS — C34.90 LUNG CANCER: Primary | ICD-10-CM

## 2024-01-29 NOTE — NURSING
Received message from Erica MUELLER-TOD/[facility] to assist with care coordination to schedule appt for second opinion per patient request spoke to Mr Degroot appt scheduled time and location confirmed he was given my direct contact information he verbalized understanding     Oncology Navigation   Intake  Cancer Type: Thoracic (Lung CA)  Internal / External Referral: External (Self Referral)  Date of Referral: 01/29/24  Initial Nurse Navigator Contact: 01/29/24  Referral to Initial Contact Timeline (days): 0  Date Worked: 01/29/24  First Appointment Available: 01/30/24  Appointment Date: 01/30/24 (Dr Khoobehi)  First Available Date vs. Scheduled Date (days): 0     Treatment                              Acuity      Follow Up  No follow-ups on file.

## 2024-01-30 ENCOUNTER — OFFICE VISIT (OUTPATIENT)
Dept: HEMATOLOGY/ONCOLOGY | Facility: CLINIC | Age: 64
End: 2024-01-30
Payer: MEDICARE

## 2024-01-30 VITALS
BODY MASS INDEX: 25.22 KG/M2 | OXYGEN SATURATION: 99 % | TEMPERATURE: 98 F | WEIGHT: 160.69 LBS | RESPIRATION RATE: 18 BRPM | SYSTOLIC BLOOD PRESSURE: 131 MMHG | HEIGHT: 67 IN | DIASTOLIC BLOOD PRESSURE: 61 MMHG | HEART RATE: 95 BPM

## 2024-01-30 DIAGNOSIS — C34.2 MALIGNANT NEOPLASM OF MIDDLE LOBE OF RIGHT LUNG: Primary | ICD-10-CM

## 2024-01-30 PROCEDURE — 99204 OFFICE O/P NEW MOD 45 MIN: CPT | Mod: S$PBB,,, | Performed by: INTERNAL MEDICINE

## 2024-01-30 PROCEDURE — 99215 OFFICE O/P EST HI 40 MIN: CPT | Mod: PBBFAC,PN | Performed by: INTERNAL MEDICINE

## 2024-01-30 PROCEDURE — 99999 PR PBB SHADOW E&M-EST. PATIENT-LVL V: CPT | Mod: PBBFAC,,, | Performed by: INTERNAL MEDICINE

## 2024-01-30 NOTE — PROGRESS NOTES
Service Date:  1/30/24    Chief Complaint: Lung Cancer (NP  second opinion )    Josseline Degroot is a 63 y.o. female here for 2nd opinion.  Patient has limited stage small cell lung cancer.  She was a former smoker.  She had a low-dose CT scan as part of her lung cancer screening performed on 11/28/23.  She had a 1.3 centimeter right middle lobe lung nodule.  This was biopsied along with a subcarinal lymph node both of which came back positive for small cell lung carcinoma.  Patient has had a PET scan which showed limited disease.  Plan is to treat her with concurrent chemoradiation.  She received 1 cycle of chemotherapy with carboplatin and etoposide on 1/10/24 has not yet started radiation therapy.  She is wondering more about her cancer as for his her odds for cure.  She tolerated the 1st cycle okay, had some fatigue and weight loss after the 1st cycle.    Review of Systems   Constitutional: Negative.    HENT: Negative.     Eyes: Negative.    Respiratory: Negative.     Cardiovascular: Negative.    Gastrointestinal: Negative.    Endocrine: Negative.    Genitourinary: Negative.    Musculoskeletal: Negative.    Integumentary:  Negative.   Neurological: Negative.    Hematological: Negative.    Psychiatric/Behavioral: Negative.          Current Outpatient Medications   Medication Instructions    albuterol (PROVENTIL/VENTOLIN HFA) 90 mcg/actuation inhaler 2 puffs, Inhalation, Every 6 hours PRN, Rescue    ascorbic acid (vitamin C) (VITAMIN C) 1,000 mg, Oral    buPROPion (WELLBUTRIN XL) 150 mg, Oral, Daily    estradioL (ESTRACE) 0.5 MG tablet TAKE 1 TABLET DAILY    famotidine (PEPCID) 40 mg, Oral, Nightly    fluticasone-umeclidin-vilanter (TRELEGY ELLIPTA) 100-62.5-25 mcg DsDv 1 puff, Inhalation, Daily    lisinopriL (PRINIVIL,ZESTRIL) 20 mg, Oral, Daily    rosuvastatin (CRESTOR) 40 mg, Oral, Daily        Past Medical History:   Diagnosis Date    Colon polyp     COPD (chronic obstructive pulmonary disease)     Depression      "GERD (gastroesophageal reflux disease)     Hyperlipidemia     Hypertension     Rheumatic fever in pediatric patient     as a child         Past Surgical History:   Procedure Laterality Date    BREAST BIOPSY Left     benign    COLONOSCOPY      COLONOSCOPY N/A 2020    Procedure: COLONOSCOPY;  Surgeon: Hao Roger MD;  Location: [facility] ENDO;  Service: Endoscopy;  Laterality: N/A;    ESOPHAGOGASTRODUODENOSCOPY N/A 2020    Procedure: EGD (ESOPHAGOGASTRODUODENOSCOPY);  Surgeon: Hao Roger MD;  Location: [facility] ENDO;  Service: Endoscopy;  Laterality: N/A;    HYSTERECTOMY      TOTAL    INJECTION OF ANESTHETIC AGENT AROUND MEDIAL BRANCH NERVES INNERVATING LUMBAR FACET JOINT Right 2020    Procedure: Block-nerve-medial branch-lumbar;  Surgeon: Sergey Perez MD;  Location: [facility] OR;  Service: Pain Management;  Laterality: Right;  L3,4,5    OOPHORECTOMY      RADIOFREQUENCY ABLATION OF LUMBAR MEDIAL BRANCH NERVE AT SINGLE LEVEL Right 2020    Procedure: Radiofrequency Ablation, Nerve, Spinal, Lumbar, Medial Branch, 1 Level;  Surgeon: Sergey Perez MD;  Location: [facility] OR;  Service: Pain Management;  Laterality: Right;  L3,4,5        Family History   Problem Relation Age of Onset    Heart disease Mother     Heart disease Father     Cancer Father     Heart disease Brother     Breast cancer Paternal Aunt     Breast cancer Paternal Grandmother        Social History     Tobacco Use    Smoking status: Former     Current packs/day: 0.00     Types: Cigarettes     Quit date: 10/1/2017     Years since quittin.3    Smokeless tobacco: Never   Substance Use Topics    Alcohol use: No    Drug use: Not Currently         Vitals:    24 0931   BP: 131/61   Pulse: 95   Resp: 18   Temp: 97.9 °F (36.6 °C)        Physical Exam:  /61 (BP Location: Left arm, Patient Position: Sitting, BP Method: Large (Automatic))   Pulse 95   Temp 97.9 °F (36.6 °C) (Temporal)   Resp 18   Ht 5' 7" (1.702 m)   Wt 72.9 kg (160 lb " 11.5 oz)   LMP  (LMP Unknown)   SpO2 99%   BMI 25.17 kg/m²     Physical Exam  Constitutional:       Appearance: Normal appearance.   HENT:      Head: Normocephalic and atraumatic.      Nose: Nose normal.      Mouth/Throat:      Mouth: Mucous membranes are moist.      Pharynx: Oropharynx is clear.   Eyes:      Conjunctiva/sclera: Conjunctivae normal.   Cardiovascular:      Rate and Rhythm: Normal rate and regular rhythm.      Heart sounds: Normal heart sounds.   Pulmonary:      Effort: Pulmonary effort is normal.      Breath sounds: Normal breath sounds.   Abdominal:      General: Abdomen is flat. Bowel sounds are normal.      Palpations: Abdomen is soft.   Musculoskeletal:         General: Normal range of motion.      Cervical back: Normal range of motion and neck supple.   Skin:     General: Skin is warm and dry.   Neurological:      General: No focal deficit present.      Mental Status: She is alert and oriented to person, place, and time. Mental status is at baseline.   Psychiatric:         Mood and Affect: Mood normal.          Labs:  Lab Results   Component Value Date    WBC 7.24 09/12/2023    RBC 4.70 09/12/2023    HGB 13.7 09/12/2023    HCT 41.8 09/12/2023    MCV 89 09/12/2023    MCH 29.1 09/12/2023    MCHC 32.8 09/12/2023    RDW 13.3 09/12/2023     09/12/2023    MPV 11.1 09/12/2023    GRAN 4.8 09/12/2023    GRAN 66.9 09/12/2023    LYMPH 1.6 09/12/2023    LYMPH 21.4 09/12/2023    MONO 0.6 09/12/2023    MONO 8.1 09/12/2023    EOS 0.2 09/12/2023    BASO 0.02 09/12/2023    EOSINOPHIL 3.0 09/12/2023    BASOPHIL 0.3 09/12/2023     Sodium   Date Value Ref Range Status   09/12/2023 141 136 - 145 mmol/L Final     Potassium   Date Value Ref Range Status   09/12/2023 4.5 3.5 - 5.1 mmol/L Final     Chloride   Date Value Ref Range Status   09/12/2023 108 95 - 110 mmol/L Final     CO2   Date Value Ref Range Status   09/12/2023 26 23 - 29 mmol/L Final     Glucose   Date Value Ref Range Status   09/12/2023 101 70  - 110 mg/dL Final     BUN   Date Value Ref Range Status   09/12/2023 14 8 - 23 mg/dL Final     Creatinine   Date Value Ref Range Status   09/12/2023 0.8 0.5 - 1.4 mg/dL Final     Calcium   Date Value Ref Range Status   09/12/2023 9.1 8.7 - 10.5 mg/dL Final     Total Protein   Date Value Ref Range Status   09/12/2023 6.3 6.0 - 8.4 g/dL Final     Albumin   Date Value Ref Range Status   09/12/2023 3.8 3.5 - 5.2 g/dL Final     Total Bilirubin   Date Value Ref Range Status   09/12/2023 0.6 0.1 - 1.0 mg/dL Final     Comment:     For infants and newborns, interpretation of results should be based  on gestational age, weight and in agreement with clinical  observations.    Premature Infant recommended reference ranges:  Up to 24 hours.............<8.0 mg/dL  Up to 48 hours............<12.0 mg/dL  3-5 days..................<15.0 mg/dL  6-29 days.................<15.0 mg/dL       Alkaline Phosphatase   Date Value Ref Range Status   09/12/2023 70 55 - 135 U/L Final     AST   Date Value Ref Range Status   09/12/2023 16 10 - 40 U/L Final     ALT   Date Value Ref Range Status   09/12/2023 23 10 - 44 U/L Final     Anion Gap   Date Value Ref Range Status   09/12/2023 7 (L) 8 - 16 mmol/L Final     eGFR if    Date Value Ref Range Status   03/12/2021 >60.0 >60 mL/min/1.73 m^2 Final     eGFR    Date Value Ref Range Status   11/04/2022 >90 >90 mL/min/1.73m2 Final     eGFR if non    Date Value Ref Range Status   03/12/2021 >60.0 >60 mL/min/1.73 m^2 Final     Comment:     Calculation used to obtain the estimated glomerular filtration  rate (eGFR) is the CKD-EPI equation.        eGFR   Date Value Ref Range Status   11/04/2022 88 (L) >90 mL/min/1.73m2 Final       A/P:    Small cell lung carcinoma of the right middle lobe  -patient received 1 cycle of carboplatin and etoposide on 1/10/24.  Plan is to treat with concurrent chemoradiation given limited disease.  -patient came in for a 2nd  opinion, and I pretty much reassured her that she is on the right track.  There is an option of changing her treatment from carboplatin etoposide to cisplatin etoposide, but as patient states she had trouble with the 1st cycle of carboplatin, then perhaps stating on this would be okay.  I explained to her that she should have a follow-up PET scan after completing treatment and then continued to be monitored.  I stated that although she does have a chance for cure, the odds of disease-free survival are about 30% in 3 years.  She understood, and will go back to her previous oncologist to continue treatment.  She is due for chemotherapy tomorrow.  -I explained to her that she can return to clinic if she needs anything in the future.      Aurash Khoobehi, MD  Hematology and Oncology

## 2024-05-22 ENCOUNTER — TELEPHONE (OUTPATIENT)
Dept: CARDIOLOGY | Facility: CLINIC | Age: 64
End: 2024-05-22
Payer: MEDICARE

## 2024-07-30 DIAGNOSIS — Z00.00 ENCOUNTER FOR MEDICARE ANNUAL WELLNESS EXAM: ICD-10-CM

## 2024-09-26 DIAGNOSIS — R73.03 PREDIABETES: ICD-10-CM

## 2024-09-26 DIAGNOSIS — I10 ESSENTIAL HYPERTENSION: ICD-10-CM

## 2024-10-28 ENCOUNTER — TELEPHONE (OUTPATIENT)
Dept: FAMILY MEDICINE | Facility: CLINIC | Age: 64
End: 2024-10-28
Payer: MEDICARE

## (undated) DEVICE — PAD GROUNDING DISPER ELECTRODE

## (undated) DEVICE — BITE BLOCK ADULT LATEX FREE

## (undated) DEVICE — GLOVE SURG ULTRA TOUCH 7.5

## (undated) DEVICE — SYS LABEL CORRECT MED

## (undated) DEVICE — KIT ENDO CARRY-ON PROC 100310

## (undated) DEVICE — SHEET DRAPE MEDIUM

## (undated) DEVICE — CANISTER SUCTION 3000CC

## (undated) DEVICE — CANNULA RADIOPAQUE 20G CURVED

## (undated) DEVICE — KIT DEFENDO VLV  AIR WATER SUC

## (undated) DEVICE — CHLORAPREP 10.5 ML APPLICATOR

## (undated) DEVICE — NDL SPINAL 25GX3.5 SPINOCAN

## (undated) DEVICE — NDL HYPODERMIC BLUNT 18G 1.5IN

## (undated) DEVICE — SYR DISP LL 5CC

## (undated) DEVICE — FORCEP BIOSY RJ 4 HOT 2.2X240

## (undated) DEVICE — SOL WATER STRL IRR 1000ML

## (undated) DEVICE — SYR 10CC LUER LOCK

## (undated) DEVICE — NDL SAFETY 25G X 1.5 ECLIPSE